# Patient Record
Sex: FEMALE | Race: BLACK OR AFRICAN AMERICAN | Employment: FULL TIME | URBAN - METROPOLITAN AREA
[De-identification: names, ages, dates, MRNs, and addresses within clinical notes are randomized per-mention and may not be internally consistent; named-entity substitution may affect disease eponyms.]

---

## 2020-04-23 LAB — TYPE, ABO & RH, EXTERNAL: NORMAL

## 2020-08-12 ENCOUNTER — ROUTINE PRENATAL (OUTPATIENT)
Dept: OBGYN CLINIC | Age: 30
End: 2020-08-12
Payer: COMMERCIAL

## 2020-08-12 VITALS
HEIGHT: 61 IN | SYSTOLIC BLOOD PRESSURE: 118 MMHG | BODY MASS INDEX: 51.83 KG/M2 | DIASTOLIC BLOOD PRESSURE: 76 MMHG | WEIGHT: 274.5 LBS

## 2020-08-12 DIAGNOSIS — O99.210 PREGNANCY COMPLICATED BY OBESITY, UNSPECIFIED TRIMESTER: Primary | ICD-10-CM

## 2020-08-12 DIAGNOSIS — Z3A.25 PREGNANCY WITH 25 COMPLETED WEEKS GESTATION: ICD-10-CM

## 2020-08-12 DIAGNOSIS — O09.92 HIGH-RISK PREGNANCY IN SECOND TRIMESTER: ICD-10-CM

## 2020-08-12 DIAGNOSIS — A59.9 TRICHIMONIASIS: ICD-10-CM

## 2020-08-12 PROCEDURE — 0502F SUBSEQUENT PRENATAL CARE: CPT | Performed by: OBSTETRICS & GYNECOLOGY

## 2020-08-12 PROCEDURE — 99213 OFFICE O/P EST LOW 20 MIN: CPT | Performed by: OBSTETRICS & GYNECOLOGY

## 2020-08-12 RX ORDER — METRONIDAZOLE 500 MG/1
500 TABLET ORAL 2 TIMES DAILY
Qty: 14 TAB | Refills: 0 | Status: SHIPPED | OUTPATIENT
Start: 2020-08-12 | End: 2020-08-19

## 2020-08-12 NOTE — LETTER
NOTIFICATION RETURN TO WORK / SCHOOL 
 
8/12/2020 9:13 AM 
 
Ms[phone] [id number] To Whom It May Concern: 
 
Mary Kayemma Mary is currently under the care of [address] 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 If there are questions or concerns please have the patient contact our office. Sincerely, Radha Aguilar MD

## 2020-08-12 NOTE — PROGRESS NOTES
Ketones:NEG // SG:NEG //  Vallarie Proper //  Ph:NEG //  Nit:NEG // Leuk: NEG   Pt presents with no new ob complaints//PT WILL COME  FOR WARD Anglin APPT 8/14/20//[facility]

## 2020-08-12 NOTE — PROGRESS NOTES
04/23/2020 9 wks 5 days zspasic1 9 wks Vertex 155 Present Yes 128/74 260.2 lbs trace none neg Negative none Comments: Pt presents for NOB appt with complaints of nausea//OB PAP, OB PANEL TODAY//[facility]    05/20/2020 13 wks 4 days zspasic1 13 wks Vertex 133 Present Yes none 124/72 262 lbs none none neg Negative none Comments: Pt presents with no new ob complaints//AFP NEXT//[facility]    06/17/2020 17 wks 4 days zspasic1 17 wks Vertex 144 Present Yes 128/70 261.6 lbs none none neg Negative none Comments: Pt presents with complaints of constipation and increase in discharge \"somedays\"//AFP TODAY//[facility] ANATOMY SCAN IN 3 WEEKS RTC IN 4 WEEKS    07/14/2020 21 wks 3 days zspasic1 21 wks Vertex 144 Present Yes none 118/64 268.4 lbs none none neg Negative none Comments: Pt presents with no new ob complaints//REFERRING TO M FOR ANATOMY, AFP WNL//[facility]

## 2020-08-13 DIAGNOSIS — Z3A.25 PREGNANCY WITH 25 COMPLETED WEEKS GESTATION: Primary | ICD-10-CM

## 2020-08-14 LAB
GLUCOSE 1H P 50 G GLC PO SERPL-MCNC: 141 MG/DL (ref 65–139)
HGB BLD-MCNC: 10.8 G/DL (ref 11.1–15.9)

## 2020-08-17 ENCOUNTER — TELEPHONE (OUTPATIENT)
Dept: OBGYN CLINIC | Age: 30
End: 2020-08-17

## 2020-08-17 DIAGNOSIS — O99.810 ABNORMAL GLUCOSE TOLERANCE IN PREGNANCY: Primary | ICD-10-CM

## 2020-08-17 NOTE — TELEPHONE ENCOUNTER
Spoke with patient regarding her results. Instructions given for 3 hour test.  Patient states she will come in tomorrow. Ordered entered.

## 2020-08-17 NOTE — TELEPHONE ENCOUNTER
----- Message from Codi Patel MD sent at 8/16/2020  2:25 PM EDT -----  Please call the patient regarding her abnormal result.   Need 3 GTT

## 2020-08-18 DIAGNOSIS — O99.810 ABNORMAL GLUCOSE AFFECTING PREGNANCY: Primary | ICD-10-CM

## 2020-08-19 LAB
GLUCOSE 1H P 100 G GLC PO SERPL-MCNC: 170 MG/DL (ref 65–179)
GLUCOSE 2H P 100 G GLC PO SERPL-MCNC: 164 MG/DL (ref 65–154)
GLUCOSE 3H P 100 G GLC PO SERPL-MCNC: 141 MG/DL (ref 65–139)
GLUCOSE P FAST SERPL-MCNC: 80 MG/DL (ref 65–94)
NOTE:, 102047: ABNORMAL

## 2020-09-02 ENCOUNTER — ROUTINE PRENATAL (OUTPATIENT)
Dept: OBGYN CLINIC | Age: 30
End: 2020-09-02
Payer: COMMERCIAL

## 2020-09-02 VITALS
BODY MASS INDEX: 52.01 KG/M2 | DIASTOLIC BLOOD PRESSURE: 78 MMHG | HEIGHT: 61 IN | WEIGHT: 275.5 LBS | SYSTOLIC BLOOD PRESSURE: 124 MMHG

## 2020-09-02 DIAGNOSIS — R82.90 ABNORMAL FINDING IN URINE: ICD-10-CM

## 2020-09-02 DIAGNOSIS — Z34.92 NORMAL PREGNANCY IN SECOND TRIMESTER: ICD-10-CM

## 2020-09-02 DIAGNOSIS — Z3A.28 28 WEEKS GESTATION OF PREGNANCY: Primary | ICD-10-CM

## 2020-09-02 PROBLEM — Z34.90 PREGNANT: Status: ACTIVE | Noted: 2020-09-02

## 2020-09-02 PROCEDURE — 0502F SUBSEQUENT PRENATAL CARE: CPT | Performed by: OBSTETRICS & GYNECOLOGY

## 2020-09-02 NOTE — PROGRESS NOTES
Ketones:NEG // Caitlin Batista //  Chio Martines //  Ph:NEG //  aMricel Forbes // Jazmyne Vicky: LARGE  Pt presents with no new ob complaints//FAILED 3HR, URINE CULTURE SENTTODAY//[facility]

## 2020-09-02 NOTE — LETTER
NOTIFICATION OF RETURN TO WORK / SCHOOL 
 
9/2/2020 8:45 AM 
 
Ms. [address] [id number] [facility] To Whom It May Concern: 
 
Jennifer Salas was under the care of [address]. She is currently able to work but should not be placed on the [facility] unit. If there are questions or concerns please have the patient contact our office. Sincerely, Sharon Latham MD

## 2020-09-03 ENCOUNTER — DOCUMENTATION ONLY (OUTPATIENT)
Dept: OBGYN CLINIC | Age: 30
End: 2020-09-03

## 2020-09-03 ENCOUNTER — TELEPHONE (OUTPATIENT)
Dept: OBGYN CLINIC | Age: 30
End: 2020-09-03

## 2020-09-03 NOTE — TELEPHONE ENCOUNTER
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 Walked in today requesting she may have a letter that she can work without any restrictions. Was told by her manager that she would not be able to work at all.

## 2020-09-04 LAB — BACTERIA UR CULT: NORMAL

## 2020-09-18 ENCOUNTER — ROUTINE PRENATAL (OUTPATIENT)
Dept: OBGYN CLINIC | Age: 30
End: 2020-09-18
Payer: COMMERCIAL

## 2020-09-18 VITALS
DIASTOLIC BLOOD PRESSURE: 78 MMHG | BODY MASS INDEX: 51.95 KG/M2 | SYSTOLIC BLOOD PRESSURE: 124 MMHG | WEIGHT: 275.13 LBS | HEIGHT: 61 IN

## 2020-09-18 DIAGNOSIS — O09.92 HIGH-RISK PREGNANCY IN SECOND TRIMESTER: ICD-10-CM

## 2020-09-18 DIAGNOSIS — Z3A.30 PREGNANCY WITH 30 COMPLETED WEEKS GESTATION: ICD-10-CM

## 2020-09-18 DIAGNOSIS — O99.210 PREGNANCY COMPLICATED BY OBESITY, UNSPECIFIED TRIMESTER: Primary | ICD-10-CM

## 2020-09-18 DIAGNOSIS — O24.410 DIET CONTROLLED GESTATIONAL DIABETES MELLITUS (GDM) IN SECOND TRIMESTER: ICD-10-CM

## 2020-09-18 PROCEDURE — 0502F SUBSEQUENT PRENATAL CARE: CPT | Performed by: OBSTETRICS & GYNECOLOGY

## 2020-09-18 NOTE — PROGRESS NOTES
Ketones:NEG // SG:NEG //  Geoff Hodgson //  Ph:NEG //  Nit:NEG // Leuk: NEG   Pt presents with no new ob complaints//SEEING MFM FOR GESTATIONAL DIABETES//[place]

## 2020-10-02 ENCOUNTER — ROUTINE PRENATAL (OUTPATIENT)
Dept: OBGYN CLINIC | Age: 30
End: 2020-10-02
Payer: COMMERCIAL

## 2020-10-02 VITALS
WEIGHT: 276 LBS | SYSTOLIC BLOOD PRESSURE: 130 MMHG | BODY MASS INDEX: 52.11 KG/M2 | HEIGHT: 61 IN | DIASTOLIC BLOOD PRESSURE: 72 MMHG

## 2020-10-02 DIAGNOSIS — O24.414 INSULIN CONTROLLED GESTATIONAL DIABETES MELLITUS (GDM) IN THIRD TRIMESTER: ICD-10-CM

## 2020-10-02 DIAGNOSIS — R82.90 UNSPECIFIED ABNORMAL FINDINGS IN URINE: Primary | ICD-10-CM

## 2020-10-02 DIAGNOSIS — O99.210 PREGNANCY COMPLICATED BY OBESITY, UNSPECIFIED TRIMESTER: ICD-10-CM

## 2020-10-02 DIAGNOSIS — O09.93 HIGH-RISK PREGNANCY IN THIRD TRIMESTER: ICD-10-CM

## 2020-10-02 LAB
BILIRUB UR QL STRIP: NEGATIVE
GLUCOSE UR-MCNC: NEGATIVE MG/DL
KETONES P FAST UR STRIP-MCNC: NORMAL MG/DL
PH UR STRIP: 6 [PH] (ref 4.6–8)
PROT UR QL STRIP: NEGATIVE
SP GR UR STRIP: 1.02 (ref 1–1.03)
UA UROBILINOGEN AMB POC: NORMAL (ref 0.2–1)
URINALYSIS CLARITY POC: NORMAL
URINALYSIS COLOR POC: NORMAL
URINE BLOOD POC: NORMAL
URINE LEUKOCYTES POC: NORMAL
URINE NITRITES POC: NEGATIVE

## 2020-10-02 PROCEDURE — 0502F SUBSEQUENT PRENATAL CARE: CPT | Performed by: OBSTETRICS & GYNECOLOGY

## 2020-10-02 PROCEDURE — 81001 URINALYSIS AUTO W/SCOPE: CPT | Performed by: OBSTETRICS & GYNECOLOGY

## 2020-10-02 NOTE — PROGRESS NOTES
HIGH RISK  IDDM  OBESITY  FOLLOWED BY MFM  WILL START NST WEEKLY  GROWTH SCAN SCHEDULED, WILL BE Q4 WEEKS

## 2020-10-02 NOTE — PROGRESS NOTES
Ketones:NEG // SG:NEG //  Blo:++ //  Ph:NEG //  Nit:NEG // Leuk: ++++   Pt presents with no new ob complaints, BS log scanned in//GBS NEXT//[facility]

## 2020-10-04 LAB — BACTERIA UR CULT: NORMAL

## 2020-10-12 ENCOUNTER — ROUTINE PRENATAL (OUTPATIENT)
Dept: OBGYN CLINIC | Age: 30
End: 2020-10-12
Payer: COMMERCIAL

## 2020-10-12 VITALS
BODY MASS INDEX: 52.13 KG/M2 | WEIGHT: 276.13 LBS | DIASTOLIC BLOOD PRESSURE: 70 MMHG | SYSTOLIC BLOOD PRESSURE: 126 MMHG | HEIGHT: 61 IN

## 2020-10-12 DIAGNOSIS — O24.414 INSULIN CONTROLLED GESTATIONAL DIABETES MELLITUS (GDM) IN THIRD TRIMESTER: ICD-10-CM

## 2020-10-12 DIAGNOSIS — O09.93 HIGH-RISK PREGNANCY IN THIRD TRIMESTER: ICD-10-CM

## 2020-10-12 DIAGNOSIS — Z3A.34 34 WEEKS GESTATION OF PREGNANCY: Primary | ICD-10-CM

## 2020-10-12 DIAGNOSIS — O99.210 PREGNANCY COMPLICATED BY OBESITY, UNSPECIFIED TRIMESTER: ICD-10-CM

## 2020-10-12 PROCEDURE — 0502F SUBSEQUENT PRENATAL CARE: CPT | Performed by: OBSTETRICS & GYNECOLOGY

## 2020-10-12 PROCEDURE — 59025 FETAL NON-STRESS TEST: CPT | Performed by: OBSTETRICS & GYNECOLOGY

## 2020-10-12 NOTE — PROGRESS NOTES
Ketones:NEG // SG:NEG //  Awanda Basilio //  Ph:NEG //  Nit:NEG // Leuk: NEG   Pt presents with complaints of lower back pain//BS LOG SCANNED IN, GBS NEXT//[facility]

## 2020-10-19 ENCOUNTER — ROUTINE PRENATAL (OUTPATIENT)
Dept: OBGYN CLINIC | Age: 30
End: 2020-10-19
Payer: COMMERCIAL

## 2020-10-19 VITALS
WEIGHT: 277.25 LBS | SYSTOLIC BLOOD PRESSURE: 130 MMHG | HEIGHT: 61 IN | BODY MASS INDEX: 52.35 KG/M2 | DIASTOLIC BLOOD PRESSURE: 76 MMHG

## 2020-10-19 DIAGNOSIS — R82.90 ABNORMAL FINDING IN URINE: ICD-10-CM

## 2020-10-19 DIAGNOSIS — Z3A.35 35 WEEKS GESTATION OF PREGNANCY: Primary | ICD-10-CM

## 2020-10-19 DIAGNOSIS — O24.414 INSULIN CONTROLLED GESTATIONAL DIABETES MELLITUS (GDM) DURING PREGNANCY, ANTEPARTUM: ICD-10-CM

## 2020-10-19 DIAGNOSIS — O09.93 HIGH-RISK PREGNANCY IN THIRD TRIMESTER: ICD-10-CM

## 2020-10-19 PROCEDURE — 59025 FETAL NON-STRESS TEST: CPT | Performed by: OBSTETRICS & GYNECOLOGY

## 2020-10-19 PROCEDURE — 0502F SUBSEQUENT PRENATAL CARE: CPT | Performed by: OBSTETRICS & GYNECOLOGY

## 2020-10-19 NOTE — PROGRESS NOTES
Ketones:NEG // SG:NEG //  Jarad Jose //  Ph:NEG //  Nit:NEG // Leuk: ++++  Pt presents with complaints of lower back pain//URINE CULTURE TODAY, GBS NEXT//[facility]

## 2020-10-21 LAB — BACTERIA UR CULT: NORMAL

## 2020-10-22 ENCOUNTER — TRANSCRIBE ORDER (OUTPATIENT)
Dept: SCHEDULING | Age: 30
End: 2020-10-22

## 2020-10-22 DIAGNOSIS — O26.843 UTERINE SIZE-DATE DISCREPANCY IN THIRD TRIMESTER: Primary | ICD-10-CM

## 2020-10-22 DIAGNOSIS — O09.93 SUPERVISION OF HIGH RISK PREGNANCY IN THIRD TRIMESTER: Primary | ICD-10-CM

## 2020-10-26 DIAGNOSIS — Z13.83 SCREENING FOR RESPIRATORY CONDITION: ICD-10-CM

## 2020-10-26 DIAGNOSIS — Z13.83 SCREENING FOR RESPIRATORY CONDITION: Primary | ICD-10-CM

## 2020-10-27 ENCOUNTER — ROUTINE PRENATAL (OUTPATIENT)
Dept: OBGYN CLINIC | Age: 30
End: 2020-10-27
Payer: COMMERCIAL

## 2020-10-27 VITALS
BODY MASS INDEX: 52.91 KG/M2 | HEIGHT: 61 IN | DIASTOLIC BLOOD PRESSURE: 78 MMHG | WEIGHT: 280.25 LBS | SYSTOLIC BLOOD PRESSURE: 134 MMHG

## 2020-10-27 DIAGNOSIS — O99.210 PREGNANCY COMPLICATED BY OBESITY, UNSPECIFIED TRIMESTER: ICD-10-CM

## 2020-10-27 DIAGNOSIS — Z36.85 SCREENING, ANTENATAL, FOR STREPTOCOCCUS B: Primary | ICD-10-CM

## 2020-10-27 DIAGNOSIS — O09.93 HIGH-RISK PREGNANCY IN THIRD TRIMESTER: ICD-10-CM

## 2020-10-27 DIAGNOSIS — R82.998 OTHER ABNORMAL FINDINGS IN URINE: ICD-10-CM

## 2020-10-27 DIAGNOSIS — O24.410 DIET CONTROLLED GESTATIONAL DIABETES MELLITUS (GDM) IN THIRD TRIMESTER: ICD-10-CM

## 2020-10-27 DIAGNOSIS — Z3A.36 36 WEEKS GESTATION OF PREGNANCY: ICD-10-CM

## 2020-10-27 PROCEDURE — 0502F SUBSEQUENT PRENATAL CARE: CPT | Performed by: OBSTETRICS & GYNECOLOGY

## 2020-10-27 PROCEDURE — 59025 FETAL NON-STRESS TEST: CPT | Performed by: OBSTETRICS & GYNECOLOGY

## 2020-10-27 NOTE — PROGRESS NOTES
Ketones:NEG // SG:NEG //  Jaclyn Mcbride //  Ph:NEG //  Nit:POS // Leuk: ++++  Pt presents with complaints of lower back pain and losing her mucus plug//GBS TODAY//[facility]

## 2020-10-28 ENCOUNTER — HOSPITAL ENCOUNTER (OUTPATIENT)
Dept: MAMMOGRAPHY | Age: 30
Discharge: HOME | End: 2020-10-28
Attending: OBSTETRICS & GYNECOLOGY
Payer: COMMERCIAL

## 2020-10-28 DIAGNOSIS — O26.843 UTERINE SIZE-DATE DISCREPANCY IN THIRD TRIMESTER: ICD-10-CM

## 2020-10-28 PROCEDURE — 76805 OB US >/= 14 WKS SNGL FETUS: CPT

## 2020-10-29 LAB — BACTERIA UR CULT: NORMAL

## 2020-10-30 DIAGNOSIS — N30.00 ACUTE CYSTITIS WITHOUT HEMATURIA: Primary | ICD-10-CM

## 2020-10-30 RX ORDER — NITROFURANTOIN 25; 75 MG/1; MG/1
100 CAPSULE ORAL 2 TIMES DAILY
Qty: 14 CAP | Refills: 0 | Status: SHIPPED | OUTPATIENT
Start: 2020-10-30 | End: 2020-11-06

## 2020-10-31 LAB — B-HEM STREP SPEC QL CULT: NEGATIVE

## 2020-11-03 ENCOUNTER — ROUTINE PRENATAL (OUTPATIENT)
Dept: OBGYN CLINIC | Age: 30
End: 2020-11-03
Payer: COMMERCIAL

## 2020-11-03 VITALS
SYSTOLIC BLOOD PRESSURE: 130 MMHG | HEIGHT: 61 IN | DIASTOLIC BLOOD PRESSURE: 78 MMHG | BODY MASS INDEX: 52.91 KG/M2 | WEIGHT: 280.25 LBS

## 2020-11-03 DIAGNOSIS — O99.210 PREGNANCY COMPLICATED BY OBESITY, UNSPECIFIED TRIMESTER: ICD-10-CM

## 2020-11-03 DIAGNOSIS — N76.0 ACUTE VAGINITIS: ICD-10-CM

## 2020-11-03 DIAGNOSIS — R30.0 DYSURIA: ICD-10-CM

## 2020-11-03 DIAGNOSIS — O09.93 HIGH-RISK PREGNANCY IN THIRD TRIMESTER: ICD-10-CM

## 2020-11-03 DIAGNOSIS — O24.419 GESTATIONAL DIABETES MELLITUS (GDM), ANTEPARTUM, GESTATIONAL DIABETES METHOD OF CONTROL UNSPECIFIED: ICD-10-CM

## 2020-11-03 DIAGNOSIS — B37.9 CANDIDA ALBICANS INFECTION: ICD-10-CM

## 2020-11-03 DIAGNOSIS — Z3A.37 37 WEEKS GESTATION OF PREGNANCY: Primary | ICD-10-CM

## 2020-11-03 PROCEDURE — 59025 FETAL NON-STRESS TEST: CPT | Performed by: OBSTETRICS & GYNECOLOGY

## 2020-11-03 PROCEDURE — 0502F SUBSEQUENT PRENATAL CARE: CPT | Performed by: OBSTETRICS & GYNECOLOGY

## 2020-11-03 RX ORDER — TERCONAZOLE 4 MG/G
1 CREAM VAGINAL
Qty: 1 TUBE | Refills: 1 | Status: SHIPPED | OUTPATIENT
Start: 2020-11-03 | End: 2020-11-10

## 2020-11-03 NOTE — PROGRESS NOTES
Ketones:NEG // SG:NEG //  Blo:+ //  Ph:NEG //  Nita Showmonroe // Leuk: ++++  Pt presents with complaints of right side pain and pain with urination//UC, NUSWAB TODAY//[facility]

## 2020-11-03 NOTE — PROGRESS NOTES
HIGH RISK OB  DM DIET CONTROLLED  EXCELLENT BS CONTROL  FOLLOWED BY MFM  NST REACTGIVE  YEAST + TERAZOL 7 CALLED

## 2020-11-05 LAB — BACTERIA UR CULT: NORMAL

## 2020-11-06 LAB
A VAGINAE DNA VAG QL NAA+PROBE: ABNORMAL SCORE
BVAB2 DNA VAG QL NAA+PROBE: ABNORMAL SCORE
C ALBICANS DNA VAG QL NAA+PROBE: POSITIVE
C GLABRATA DNA VAG QL NAA+PROBE: NEGATIVE
C KRUSEI DNA VAG QL NAA+PROBE: NEGATIVE
C LUSITANIAE DNA VAG QL NAA+PROBE: NEGATIVE
C TRACH DNA VAG QL NAA+PROBE: NEGATIVE
CANDIDA DNA VAG QL NAA+PROBE: NEGATIVE
MEGA1 DNA VAG QL NAA+PROBE: ABNORMAL SCORE
N GONORRHOEA DNA VAG QL NAA+PROBE: NEGATIVE
T VAGINALIS DNA VAG QL NAA+PROBE: NEGATIVE

## 2020-11-09 ENCOUNTER — HOSPITAL ENCOUNTER (OUTPATIENT)
Dept: PREADMISSION TESTING | Age: 30
Discharge: HOME | End: 2020-11-09
Payer: COMMERCIAL

## 2020-11-09 LAB — SARS-COV-2, COV2: NORMAL

## 2020-11-09 PROCEDURE — 87635 SARS-COV-2 COVID-19 AMP PRB: CPT

## 2020-11-10 LAB — SARS-COV-2, COV2NT: NOT DETECTED

## 2020-11-11 ENCOUNTER — ROUTINE PRENATAL (OUTPATIENT)
Dept: OBGYN CLINIC | Age: 30
End: 2020-11-11
Payer: COMMERCIAL

## 2020-11-11 VITALS
WEIGHT: 283.25 LBS | DIASTOLIC BLOOD PRESSURE: 82 MMHG | BODY MASS INDEX: 53.48 KG/M2 | SYSTOLIC BLOOD PRESSURE: 134 MMHG | HEIGHT: 61 IN

## 2020-11-11 DIAGNOSIS — O99.210 PREGNANCY COMPLICATED BY OBESITY, UNSPECIFIED TRIMESTER: ICD-10-CM

## 2020-11-11 DIAGNOSIS — E11.9 TYPE 2 DIABETES MELLITUS WITHOUT COMPLICATION, UNSPECIFIED WHETHER LONG TERM INSULIN USE (HCC): ICD-10-CM

## 2020-11-11 DIAGNOSIS — O09.93 HIGH-RISK PREGNANCY IN THIRD TRIMESTER: Primary | ICD-10-CM

## 2020-11-11 DIAGNOSIS — Z3A.38 38 WEEKS GESTATION OF PREGNANCY: ICD-10-CM

## 2020-11-11 PROCEDURE — 0502F SUBSEQUENT PRENATAL CARE: CPT | Performed by: OBSTETRICS & GYNECOLOGY

## 2020-11-11 PROCEDURE — 59025 FETAL NON-STRESS TEST: CPT | Performed by: OBSTETRICS & GYNECOLOGY

## 2020-11-11 NOTE — PROGRESS NOTES
Ketones:NEG // SG:NEG //  Rahel Fent //  Ph:NEG //  Nit:NEG // Leuk: NEG   Pt presents with complaints of swelling in feet and hands//GBS NEG//[facility]

## 2020-11-16 ENCOUNTER — ROUTINE PRENATAL (OUTPATIENT)
Dept: OBGYN CLINIC | Age: 30
End: 2020-11-16
Payer: COMMERCIAL

## 2020-11-16 VITALS
HEIGHT: 61 IN | BODY MASS INDEX: 53.81 KG/M2 | WEIGHT: 285 LBS | DIASTOLIC BLOOD PRESSURE: 80 MMHG | SYSTOLIC BLOOD PRESSURE: 130 MMHG

## 2020-11-16 DIAGNOSIS — Z3A.39 39 WEEKS GESTATION OF PREGNANCY: Primary | ICD-10-CM

## 2020-11-16 DIAGNOSIS — O09.93 HIGH-RISK PREGNANCY IN THIRD TRIMESTER: ICD-10-CM

## 2020-11-16 DIAGNOSIS — O24.419 GESTATIONAL DIABETES MELLITUS (GDM) IN THIRD TRIMESTER, GESTATIONAL DIABETES METHOD OF CONTROL UNSPECIFIED: ICD-10-CM

## 2020-11-16 PROCEDURE — 0502F SUBSEQUENT PRENATAL CARE: CPT | Performed by: OBSTETRICS & GYNECOLOGY

## 2020-11-16 PROCEDURE — 59025 FETAL NON-STRESS TEST: CPT | Performed by: OBSTETRICS & GYNECOLOGY

## 2020-11-16 NOTE — PROGRESS NOTES
Ketones:NEG // SG:NEG //  Dameon Pancho //  Ph:NEG //  Nit:NEG // Leuk: NEG   Pt presents with no new ob complaints//GBS NEG//[facility]

## 2020-11-17 ENCOUNTER — HOSPITAL ENCOUNTER (INPATIENT)
Age: 30
LOS: 4 days | Discharge: HOME | End: 2020-11-21
Attending: OBSTETRICS & GYNECOLOGY | Admitting: OBSTETRICS & GYNECOLOGY
Payer: COMMERCIAL

## 2020-11-17 PROBLEM — O24.410 DIET CONTROLLED GESTATIONAL DIABETES MELLITUS (GDM) IN THIRD TRIMESTER: Status: ACTIVE | Noted: 2020-11-17

## 2020-11-17 PROBLEM — Z34.90 PREGNANCY: Status: ACTIVE | Noted: 2020-11-17

## 2020-11-17 PROBLEM — Z3A.39 39 WEEKS GESTATION OF PREGNANCY: Status: ACTIVE | Noted: 2020-11-17

## 2020-11-17 LAB
ABO + RH BLD: NORMAL
APPEARANCE UR: ABNORMAL
BACTERIA URNS QL MICRO: NEGATIVE /HPF
BASOPHILS # BLD: 0 K/UL (ref 0–0.1)
BASOPHILS NFR BLD: 0 % (ref 0–1)
BILIRUB UR QL: NEGATIVE
BLOOD GROUP ANTIBODIES SERPL: NEGATIVE
COLOR UR: ABNORMAL
DIFFERENTIAL METHOD BLD: ABNORMAL
EOSINOPHIL # BLD: 0.1 K/UL (ref 0–0.4)
EOSINOPHIL NFR BLD: 1 % (ref 0–7)
ERYTHROCYTE [DISTWIDTH] IN BLOOD BY AUTOMATED COUNT: 16.8 % (ref 11.5–14.5)
GLUCOSE UR STRIP.AUTO-MCNC: NEGATIVE MG/DL
HCT VFR BLD AUTO: 41 % (ref 35–47)
HGB BLD-MCNC: 13.4 G/DL (ref 11.5–16)
HGB UR QL STRIP: NEGATIVE
IMM GRANULOCYTES # BLD AUTO: 0.2 K/UL (ref 0–0.04)
IMM GRANULOCYTES NFR BLD AUTO: 1 % (ref 0–0.5)
KETONES UR QL STRIP.AUTO: NEGATIVE MG/DL
LEUKOCYTE ESTERASE UR QL STRIP.AUTO: ABNORMAL
LYMPHOCYTES # BLD: 2.2 K/UL (ref 0.8–3.5)
LYMPHOCYTES NFR BLD: 17 % (ref 12–49)
MCH RBC QN AUTO: 29.2 PG (ref 26–34)
MCHC RBC AUTO-ENTMCNC: 32.7 G/DL (ref 30–36.5)
MCV RBC AUTO: 89.3 FL (ref 80–99)
MONOCYTES # BLD: 0.9 K/UL (ref 0–1)
MONOCYTES NFR BLD: 7 % (ref 5–13)
MUCOUS THREADS URNS QL MICRO: ABNORMAL /LPF
NEUTS SEG # BLD: 9.5 K/UL (ref 1.8–8)
NEUTS SEG NFR BLD: 74 % (ref 32–75)
NITRITE UR QL STRIP.AUTO: NEGATIVE
PH UR STRIP: 6 [PH] (ref 5–8)
PLATELET # BLD AUTO: 283 K/UL (ref 150–400)
PMV BLD AUTO: 10.6 FL (ref 8.9–12.9)
PROT UR STRIP-MCNC: NEGATIVE MG/DL
RBC # BLD AUTO: 4.59 M/UL (ref 3.8–5.2)
RBC #/AREA URNS HPF: ABNORMAL /HPF (ref 0–5)
SP GR UR REFRACTOMETRY: 1.01 (ref 1–1.03)
SPECIMEN EXP DATE BLD: NORMAL
UA: UC IF INDICATED,UAUC: ABNORMAL
UROBILINOGEN UR QL STRIP.AUTO: 0.1 EU/DL (ref 0.1–1)
WBC # BLD AUTO: 12.6 K/UL (ref 3.6–11)
WBC URNS QL MICRO: ABNORMAL /HPF (ref 0–4)

## 2020-11-17 PROCEDURE — 85025 COMPLETE CBC W/AUTO DIFF WBC: CPT

## 2020-11-17 PROCEDURE — 86900 BLOOD TYPING SEROLOGIC ABO: CPT

## 2020-11-17 PROCEDURE — 65410000002 HC RM PRIVATE OB

## 2020-11-17 PROCEDURE — 74011250636 HC RX REV CODE- 250/636: Performed by: OBSTETRICS & GYNECOLOGY

## 2020-11-17 PROCEDURE — 74011250637 HC RX REV CODE- 250/637: Performed by: OBSTETRICS & GYNECOLOGY

## 2020-11-17 PROCEDURE — 36415 COLL VENOUS BLD VENIPUNCTURE: CPT

## 2020-11-17 PROCEDURE — 59200 INSERT CERVICAL DILATOR: CPT

## 2020-11-17 PROCEDURE — 81001 URINALYSIS AUTO W/SCOPE: CPT

## 2020-11-17 RX ORDER — BUTORPHANOL TARTRATE 1 MG/ML
1 INJECTION INTRAMUSCULAR; INTRAVENOUS
Status: DISCONTINUED | OUTPATIENT
Start: 2020-11-17 | End: 2020-11-19

## 2020-11-17 RX ORDER — BUTORPHANOL TARTRATE 1 MG/ML
1 INJECTION INTRAMUSCULAR; INTRAVENOUS
Status: DISCONTINUED | OUTPATIENT
Start: 2020-11-17 | End: 2020-11-17

## 2020-11-17 RX ORDER — ZOLPIDEM TARTRATE 5 MG/1
5 TABLET ORAL
Status: DISCONTINUED | OUTPATIENT
Start: 2020-11-17 | End: 2020-11-19

## 2020-11-17 RX ORDER — ONDANSETRON 2 MG/ML
4 INJECTION INTRAMUSCULAR; INTRAVENOUS
Status: DISCONTINUED | OUTPATIENT
Start: 2020-11-17 | End: 2020-11-18

## 2020-11-17 RX ADMIN — BUTORPHANOL TARTRATE 1 MG: 1 INJECTION, SOLUTION INTRAMUSCULAR; INTRAVENOUS at 23:12

## 2020-11-17 RX ADMIN — ZOLPIDEM TARTRATE 5 MG: 5 TABLET ORAL at 20:57

## 2020-11-17 RX ADMIN — DINOPROSTONE 10 MG: 10 INSERT VAGINAL at 17:29

## 2020-11-17 NOTE — PROGRESS NOTES
1630 Patient ambulated to L&D room 3 for scheduled induction with significant other Erwin. Patient to bathroom to change into gown and provide urine specimen. 1639 External monitoring started with positive fetal heart tones. Positive fetal movement. 18 #20g INT placed in left lateral AC by Colby Dunne, WILLIAMS on first attempt. Patient tolerated well. [facility] Dr. Carlos Lesches to assess patient and place Cervidil vaginally. MD discussed plan of care. Patient verbalized understanding    1805 Diet ginger ale and ice water provided per request.  Significant other at bedside for emotional support. 1815 Significant other out to get dinner. Patient resting watching T.V. No c/o or request at this time. 1902 Broken fhr tracing while attempting to get out of bed to void. 1905 Bedside report given to Julia Valero RN. Patient return to bed after voiding.

## 2020-11-18 ENCOUNTER — ANESTHESIA EVENT (OUTPATIENT)
Dept: LABOR AND DELIVERY | Age: 30
End: 2020-11-18
Payer: COMMERCIAL

## 2020-11-18 ENCOUNTER — ANESTHESIA (OUTPATIENT)
Dept: LABOR AND DELIVERY | Age: 30
End: 2020-11-18
Payer: COMMERCIAL

## 2020-11-18 ENCOUNTER — HOSPITAL ENCOUNTER (OUTPATIENT)
Dept: PREADMISSION TESTING | Age: 30
Discharge: HOME | End: 2020-11-18

## 2020-11-18 VITALS — OXYGEN SATURATION: 99 % | HEART RATE: 72 BPM | DIASTOLIC BLOOD PRESSURE: 60 MMHG | SYSTOLIC BLOOD PRESSURE: 154 MMHG

## 2020-11-18 LAB
GLUCOSE BLD STRIP.AUTO-MCNC: 64 MG/DL (ref 65–100)
GLUCOSE BLD STRIP.AUTO-MCNC: 89 MG/DL (ref 65–100)
GLUCOSE BLD STRIP.AUTO-MCNC: 91 MG/DL (ref 65–100)
PERFORMED BY, TECHID: ABNORMAL
PERFORMED BY, TECHID: NORMAL
PERFORMED BY, TECHID: NORMAL

## 2020-11-18 PROCEDURE — 74011250636 HC RX REV CODE- 250/636: Performed by: OBSTETRICS & GYNECOLOGY

## 2020-11-18 PROCEDURE — 00HU33Z INSERTION OF INFUSION DEVICE INTO SPINAL CANAL, PERCUTANEOUS APPROACH: ICD-10-PCS | Performed by: ANESTHESIOLOGY

## 2020-11-18 PROCEDURE — 3E033VJ INTRODUCTION OF OTHER HORMONE INTO PERIPHERAL VEIN, PERCUTANEOUS APPROACH: ICD-10-PCS | Performed by: OBSTETRICS & GYNECOLOGY

## 2020-11-18 PROCEDURE — 82962 GLUCOSE BLOOD TEST: CPT

## 2020-11-18 PROCEDURE — 74011250636 HC RX REV CODE- 250/636: Performed by: NURSE ANESTHETIST, CERTIFIED REGISTERED

## 2020-11-18 PROCEDURE — 65410000002 HC RM PRIVATE OB

## 2020-11-18 PROCEDURE — 74011000250 HC RX REV CODE- 250: Performed by: ANESTHESIOLOGY

## 2020-11-18 PROCEDURE — 76060000078 HC EPIDURAL ANESTHESIA

## 2020-11-18 PROCEDURE — 3E0P7VZ INTRODUCTION OF HORMONE INTO FEMALE REPRODUCTIVE, VIA NATURAL OR ARTIFICIAL OPENING: ICD-10-PCS | Performed by: OBSTETRICS & GYNECOLOGY

## 2020-11-18 PROCEDURE — 74011000250 HC RX REV CODE- 250: Performed by: NURSE ANESTHETIST, CERTIFIED REGISTERED

## 2020-11-18 PROCEDURE — 74011250636 HC RX REV CODE- 250/636

## 2020-11-18 RX ORDER — NALOXONE HYDROCHLORIDE 0.4 MG/ML
0.4 INJECTION, SOLUTION INTRAMUSCULAR; INTRAVENOUS; SUBCUTANEOUS AS NEEDED
Status: DISCONTINUED | OUTPATIENT
Start: 2020-11-18 | End: 2020-11-19

## 2020-11-18 RX ORDER — SODIUM CHLORIDE, SODIUM LACTATE, POTASSIUM CHLORIDE, CALCIUM CHLORIDE 600; 310; 30; 20 MG/100ML; MG/100ML; MG/100ML; MG/100ML
500 INJECTION, SOLUTION INTRAVENOUS ONCE
Status: COMPLETED | OUTPATIENT
Start: 2020-11-18 | End: 2020-11-19

## 2020-11-18 RX ORDER — OXYTOCIN/0.9 % SODIUM CHLORIDE 30/500 ML
1 PLASTIC BAG, INJECTION (ML) INTRAVENOUS
Status: DISCONTINUED | OUTPATIENT
Start: 2020-11-18 | End: 2020-11-19

## 2020-11-18 RX ORDER — BUPIVACAINE HYDROCHLORIDE 5 MG/ML
INJECTION, SOLUTION EPIDURAL; INTRACAUDAL AS NEEDED
Status: DISCONTINUED | OUTPATIENT
Start: 2020-11-18 | End: 2020-11-19 | Stop reason: HOSPADM

## 2020-11-18 RX ORDER — FENTANYL CITRATE 50 UG/ML
INJECTION, SOLUTION INTRAMUSCULAR; INTRAVENOUS
Status: COMPLETED
Start: 2020-11-18 | End: 2020-11-18

## 2020-11-18 RX ORDER — BUPIVACAINE HYDROCHLORIDE 2.5 MG/ML
INJECTION, SOLUTION EPIDURAL; INFILTRATION; INTRACAUDAL
Status: COMPLETED
Start: 2020-11-18 | End: 2020-11-18

## 2020-11-18 RX ORDER — OXYTOCIN/0.9 % SODIUM CHLORIDE 30/500 ML
PLASTIC BAG, INJECTION (ML) INTRAVENOUS
Status: COMPLETED
Start: 2020-11-18 | End: 2020-11-18

## 2020-11-18 RX ORDER — ONDANSETRON 2 MG/ML
4 INJECTION INTRAMUSCULAR; INTRAVENOUS AS NEEDED
Status: DISCONTINUED | OUTPATIENT
Start: 2020-11-18 | End: 2020-11-19

## 2020-11-18 RX ORDER — FENTANYL CITRATE 50 UG/ML
INJECTION, SOLUTION INTRAMUSCULAR; INTRAVENOUS AS NEEDED
Status: DISCONTINUED | OUTPATIENT
Start: 2020-11-18 | End: 2020-11-19 | Stop reason: HOSPADM

## 2020-11-18 RX ORDER — FENTANYL CITRATE 50 UG/ML
INJECTION, SOLUTION INTRAMUSCULAR; INTRAVENOUS
Status: DISCONTINUED
Start: 2020-11-18 | End: 2020-11-19

## 2020-11-18 RX ORDER — BUPIVACAINE HYDROCHLORIDE 2.5 MG/ML
INJECTION, SOLUTION EPIDURAL; INFILTRATION; INTRACAUDAL AS NEEDED
Status: DISCONTINUED | OUTPATIENT
Start: 2020-11-18 | End: 2020-11-19 | Stop reason: HOSPADM

## 2020-11-18 RX ORDER — BUPIVACAINE HYDROCHLORIDE 5 MG/ML
INJECTION, SOLUTION EPIDURAL; INTRACAUDAL
Status: COMPLETED
Start: 2020-11-18 | End: 2020-11-18

## 2020-11-18 RX ORDER — NORETHINDRONE AND ETHINYL ESTRADIOL 0.5-0.035
12.5 KIT ORAL
Status: DISCONTINUED | OUTPATIENT
Start: 2020-11-18 | End: 2020-11-19

## 2020-11-18 RX ORDER — FENTANYL 0.2 MG/100ML-BUPIV 0.125%-NACL 0.9% EPIDURAL INJ 2/0.125 MCG/ML-%
250 SOLUTION INJECTION CONTINUOUS
Status: DISCONTINUED | OUTPATIENT
Start: 2020-11-18 | End: 2020-11-19

## 2020-11-18 RX ORDER — LIDOCAINE HYDROCHLORIDE AND EPINEPHRINE 15; 5 MG/ML; UG/ML
INJECTION, SOLUTION EPIDURAL
Status: SHIPPED | OUTPATIENT
Start: 2020-11-18 | End: 2020-11-18

## 2020-11-18 RX ORDER — SODIUM CHLORIDE, SODIUM LACTATE, POTASSIUM CHLORIDE, CALCIUM CHLORIDE 600; 310; 30; 20 MG/100ML; MG/100ML; MG/100ML; MG/100ML
125 INJECTION, SOLUTION INTRAVENOUS CONTINUOUS
Status: DISCONTINUED | OUTPATIENT
Start: 2020-11-18 | End: 2020-11-19

## 2020-11-18 RX ADMIN — OXYTOCIN-SODIUM CHLORIDE 0.9% IV SOLN 30 UNIT/500ML 12 MILLI-UNITS/MIN: 30-0.9/5 SOLUTION at 14:02

## 2020-11-18 RX ADMIN — SODIUM CHLORIDE, POTASSIUM CHLORIDE, SODIUM LACTATE AND CALCIUM CHLORIDE 125 ML/HR: 600; 310; 30; 20 INJECTION, SOLUTION INTRAVENOUS at 04:06

## 2020-11-18 RX ADMIN — OXYTOCIN-SODIUM CHLORIDE 0.9% IV SOLN 30 UNIT/500ML 4 MILLI-UNITS/MIN: 30-0.9/5 SOLUTION at 09:14

## 2020-11-18 RX ADMIN — OXYTOCIN-SODIUM CHLORIDE 0.9% IV SOLN 30 UNIT/500ML 11 MILLI-UNITS/MIN: 30-0.9/5 SOLUTION at 13:43

## 2020-11-18 RX ADMIN — SODIUM CHLORIDE, POTASSIUM CHLORIDE, SODIUM LACTATE AND CALCIUM CHLORIDE 125 ML/HR: 600; 310; 30; 20 INJECTION, SOLUTION INTRAVENOUS at 20:31

## 2020-11-18 RX ADMIN — OXYTOCIN-SODIUM CHLORIDE 0.9% IV SOLN 30 UNIT/500ML 7 MILLI-UNITS/MIN: 30-0.9/5 SOLUTION at 10:30

## 2020-11-18 RX ADMIN — OXYTOCIN-SODIUM CHLORIDE 0.9% IV SOLN 30 UNIT/500ML 10 MILLI-UNITS/MIN: 30-0.9/5 SOLUTION at 12:50

## 2020-11-18 RX ADMIN — OXYTOCIN-SODIUM CHLORIDE 0.9% IV SOLN 30 UNIT/500ML 6 MILLI-UNITS/MIN: 30-0.9/5 SOLUTION at 10:05

## 2020-11-18 RX ADMIN — LIDOCAINE HYDROCHLORIDE AND EPINEPHRINE 3 ML: 15; 5 INJECTION, SOLUTION EPIDURAL at 11:59

## 2020-11-18 RX ADMIN — OXYTOCIN-SODIUM CHLORIDE 0.9% IV SOLN 30 UNIT/500ML 8 MILLI-UNITS/MIN: 30-0.9/5 SOLUTION at 10:50

## 2020-11-18 RX ADMIN — FENTANYL CITRATE 100 MCG: 50 INJECTION, SOLUTION INTRAMUSCULAR; INTRAVENOUS at 12:04

## 2020-11-18 RX ADMIN — SODIUM CHLORIDE, POTASSIUM CHLORIDE, SODIUM LACTATE AND CALCIUM CHLORIDE 500 ML: 600; 310; 30; 20 INJECTION, SOLUTION INTRAVENOUS at 11:31

## 2020-11-18 RX ADMIN — Medication 250 ML: at 12:50

## 2020-11-18 RX ADMIN — BUPIVACAINE HYDROCHLORIDE 10 ML: 5 INJECTION, SOLUTION EPIDURAL; INTRACAUDAL; PERINEURAL at 20:26

## 2020-11-18 RX ADMIN — OXYTOCIN-SODIUM CHLORIDE 0.9% IV SOLN 30 UNIT/500ML 13 MILLI-UNITS/MIN: 30-0.9/5 SOLUTION at 14:20

## 2020-11-18 RX ADMIN — OXYTOCIN-SODIUM CHLORIDE 0.9% IV SOLN 30 UNIT/500ML 5 MILLI-UNITS/MIN: 30-0.9/5 SOLUTION at 09:35

## 2020-11-18 RX ADMIN — OXYTOCIN-SODIUM CHLORIDE 0.9% IV SOLN 30 UNIT/500ML 2 MILLI-UNITS/MIN: 30-0.9/5 SOLUTION at 08:30

## 2020-11-18 RX ADMIN — OXYTOCIN-SODIUM CHLORIDE 0.9% IV SOLN 30 UNIT/500ML 9 MILLI-UNITS/MIN: 30-0.9/5 SOLUTION at 11:28

## 2020-11-18 RX ADMIN — BUTORPHANOL TARTRATE 1 MG: 1 INJECTION, SOLUTION INTRAMUSCULAR; INTRAVENOUS at 11:31

## 2020-11-18 RX ADMIN — BUTORPHANOL TARTRATE 1 MG: 1 INJECTION, SOLUTION INTRAMUSCULAR; INTRAVENOUS at 08:30

## 2020-11-18 RX ADMIN — OXYTOCIN-SODIUM CHLORIDE 0.9% IV SOLN 30 UNIT/500ML 1 MILLI-UNITS: 30-0.9/5 SOLUTION at 08:15

## 2020-11-18 RX ADMIN — BUPIVACAINE HYDROCHLORIDE 6 ML: 2.5 INJECTION, SOLUTION EPIDURAL; INFILTRATION; INTRACAUDAL at 12:04

## 2020-11-18 RX ADMIN — OXYTOCIN-SODIUM CHLORIDE 0.9% IV SOLN 30 UNIT/500ML 9 MILLI-UNITS/MIN: 30-0.9/5 SOLUTION at 12:28

## 2020-11-18 RX ADMIN — FENTANYL CITRATE 100 MCG: 50 INJECTION, SOLUTION INTRAMUSCULAR; INTRAVENOUS at 20:26

## 2020-11-18 RX ADMIN — OXYTOCIN-SODIUM CHLORIDE 0.9% IV SOLN 30 UNIT/500ML 3 MILLI-UNITS/MIN: 30-0.9/5 SOLUTION at 08:50

## 2020-11-18 RX ADMIN — BUTORPHANOL TARTRATE 1 MG: 1 INJECTION, SOLUTION INTRAMUSCULAR; INTRAVENOUS at 04:04

## 2020-11-18 RX ADMIN — SODIUM CHLORIDE, POTASSIUM CHLORIDE, SODIUM LACTATE AND CALCIUM CHLORIDE 125 ML/HR: 600; 310; 30; 20 INJECTION, SOLUTION INTRAVENOUS at 12:25

## 2020-11-18 NOTE — ANESTHESIA PREPROCEDURE EVALUATION
Relevant Problems   No relevant active problems       Anesthetic History   No history of anesthetic complications            Review of Systems / Medical History  Patient summary reviewed, nursing notes reviewed and pertinent labs reviewed    Pulmonary  Within defined limits                 Neuro/Psych   Within defined limits           Cardiovascular  Within defined limits                     GI/Hepatic/Renal  Within defined limits              Endo/Other             Other Findings   Comments: Gestational diabetes           Physical Exam    Airway  Mallampati: III           Cardiovascular  Regular rate and rhythm,  S1 and S2 normal,  no murmur, click, rub, or gallop             Dental  No notable dental hx       Pulmonary                 Abdominal         Other Findings            Anesthetic Plan    ASA: 2  Anesthesia type: epidural            Anesthetic plan and risks discussed with: Patient

## 2020-11-18 NOTE — ANESTHESIA PROCEDURE NOTES
Epidural Block    Start time: 11/18/2020 11:50 AM  End time: 11/18/2020 12:05 PM  Performed by: Aubery Peabody, CRNA  Authorized by: Aubery Peabody, CRNA     Pre-Procedure  Indication: labor epidural    Preanesthetic Checklist: patient identified, risks and benefits discussed, anesthesia consent, site marked, patient being monitored, timeout performed and anesthesia consent    Timeout Time: 11:50        Epidural:   Patient position:  Seated  Prep region:  Lumbar  Prep: Patient draped and Chlorhexidine    Location:  L2-3    Needle and Epidural Catheter:   Needle Type:  Tuohy  Needle Gauge:  18 G  Injection Technique:  Loss of resistance using saline  Attempts:  1  Catheter at Skin Depth (cm):  15  Depth in Epidural Space (cm):  9  Events: no blood with aspiration, no cerebrospinal fluid with aspiration, no paresthesia and negative aspiration test    Test Dose:  Negative    Assessment:   Catheter Secured:  Tegaderm and tape  Insertion:  Uncomplicated  Patient tolerance:  Patient tolerated the procedure well with no immediate complications

## 2020-11-18 NOTE — PROGRESS NOTES
OB PROGRESS NOTE    PROBLEM:  Pregnancy at 39-3/7-week gestation  Gestational diabetes, diet controlled  Meconium in amniotic fluid    SUBJECTIVE: Patient is doing well, has no complaints.     VITALS:  Visit Vitals  BP (!) 119/54   Pulse 81   Temp 98.5 °F (36.9 °C)   Resp 16   Ht 5' 2\" (1.575 m)   Wt 129.3 kg (285 lb)   SpO2 99%   BMI 52.13 kg/m²     HEART: Regular rhythm, no murmur  LUNGS: Clear to auscultation at both fields  ABDOMEN: Gravid, fetal heart tones present  PELVIC: Cervical dilation: 2 cm, effacement: 80%, Station: -3, presentation: Vertex, membranes: Artificially ruptured, meconium amniotic fluid noted      LABS:  Recent Results (from the past 24 hour(s))   TYPE & SCREEN    Collection Time: 11/17/20  4:45 PM   Result Value Ref Range    Crossmatch Expiration 11/20/2020,2359     ABO/Rh(D) A Positive     Antibody screen Negative    URINALYSIS W/ REFLEX CULTURE    Collection Time: 11/17/20  4:45 PM    Specimen: Urine   Result Value Ref Range    Color Yellow/Straw      Appearance Turbid (A) Clear      Specific gravity 1.012 1.003 - 1.030      pH (UA) 6.0 5.0 - 8.0      Protein Negative Negative mg/dL    Glucose Negative Negative mg/dL    Ketone Negative Negative mg/dL    Bilirubin Negative Negative      Blood Negative Negative      Urobilinogen 0.1 0.1 - 1.0 EU/dL    Nitrites Negative Negative      Leukocyte Esterase Large (A) Negative      UA:UC IF INDICATED Culture not indicated by UA result Culture not indicated by UA result      WBC 5-10 0 - 4 /hpf    RBC 0-5 0 - 5 /hpf    Bacteria Negative Negative /hpf    Mucus Trace /lpf   CBC WITH AUTOMATED DIFF    Collection Time: 11/17/20  5:00 PM   Result Value Ref Range    WBC 12.6 (H) 3.6 - 11.0 K/uL    RBC 4.59 3.80 - 5.20 M/uL    HGB 13.4 11.5 - 16.0 g/dL    HCT 41.0 35.0 - 47.0 %    MCV 89.3 80.0 - 99.0 FL    MCH 29.2 26.0 - 34.0 PG    MCHC 32.7 30.0 - 36.5 g/dL    RDW 16.8 (H) 11.5 - 14.5 %    PLATELET 544 785 - 229 K/uL    MPV 10.6 8.9 - 12.9 FL NEUTROPHILS 74 32 - 75 %    LYMPHOCYTES 17 12 - 49 %    MONOCYTES 7 5 - 13 %    EOSINOPHILS 1 0 - 7 %    BASOPHILS 0 0 - 1 %    IMMATURE GRANULOCYTES 1 (H) 0.0 - 0.5 %    ABS. NEUTROPHILS 9.5 (H) 1.8 - 8.0 K/UL    ABS. LYMPHOCYTES 2.2 0.8 - 3.5 K/UL    ABS. MONOCYTES 0.9 0.0 - 1.0 K/UL    ABS. EOSINOPHILS 0.1 0.0 - 0.4 K/UL    ABS. BASOPHILS 0.0 0.0 - 0.1 K/UL    ABS. IMM. GRANS. 0.2 (H) 0.00 - 0.04 K/UL    DF AUTOMATED     GLUCOSE, POC    Collection Time: 20  7:37 AM   Result Value Ref Range    Glucose (POC) 89 65 - 100 mg/dL    Performed by Wilder Casey        Fetal monitoring: Category 1    ASSESSMENT: 31years old  with pregnancy at 39-3/7 weeks gestation admitted for labor induction. After overnight Cervidil, there is significant cervical change.     PLAN:  Continuous fetal monitoring  Start Pitocin induction/augmentation

## 2020-11-18 NOTE — PROGRESS NOTES
0708: Received care of Ms. Shields resting in bed and awake. No acute distress noted. Jerome Moya, patient's boyfriend, remains at bedside. Bedside report received from PEDRO LUIS Rogel RN.    0039: Shift Assessment initiated and completed. 0725: IVF: L/R resumed at 125ml/hr via pump, no infiltration noted. 4957: One Touch: 80, asymptomatic.     0745: Dr. Loki Dunaway present. Dr. Loki Dunaway reviewed the patient's OB strip which registered last night up to present time. The writer informed Dr. Loki Dunaway of the patient's One Touch, 80. Verbal order received to obtain a One Touch every 8 hour. 26: Dr. Loki Dunaway in the patient's room. 7521: VE performed by Dr. Loki Dunaway. Cervix: 2/80%/-3. AROM by Dr. Loki Dunaway. Moderate amount of slightly tint meconium stained amniotic fluid. Adelaida-pad changed. The writer reciewved a verbal order to initiate the Pitocin drip at 1ml and increase by 1ml every 15 minutes up to 24ml or active labor. 8427: Pulse Oximeter applied. 0815: Pitocin drip initiated at 1ml as ordered by Dr. Loki Dunaway. 0830: Stadol 1mg given IVP and slowly. Pitocin drip increased to 2ml. 3469: Pitocin drip increased to 3ml.    0910: Pitocin drip increased to 4ml. 0935: Pitocin drip increased to 5ml. 1005: Pitocin drip increased to 6ml. 1037: Pitocin drip increased to 7ml. 1050: Pitocin drip increased to 8ml. 1113: Dr. Loki Dunaway in the patient's room. VE performed by Dr. Loki Dunaway. Cervix: 4cm/80%/-3. Verbal order received from Dr. Loki Dunaway as followed. Patient can have an epidural.    1128: Pitocin drip increased to 9ml.    1131: Stadol 1mg given IVP and slowly for pain level, \"7\". IVF: L/R bolus initiated via the pump prior the the epidural procedure. No infiltration noted. 1140: The writer Latia Esquivel CRNA was informed by the writer of the order for an epidural. Will come.     1145: Latia Esquivel CRNA in the patient's room and interviewing the patient for an epidural.    1149: Patient sitting on the side of the bed prior to the epidural procedure. Consent witnessed and signed. 1150: Time out for the epidural procedure performed. Radames Garcia CRNA, Wicho Pulido RN and SN Felix present. 1153: Patient's back was prepped by Radames Garcia CRNA prior to the epidural.    1155:Epidural procedure initiated. 1158: Epidural catheter placed by Radames Garcia CRNA.     4758: Test dose administered via the epidural catheter by Radames Garcia See Anesthesia Record for medication administered via the epidural catheter. 1203: Patient assisted to bed.    1206: Loading dose administered via the epidural catheter by Radames Garcia CRNA. See the Anesthesia Record for medication administered via the epidural catheter. 1216: Duarte catheter placed, See the Avatar for findings. Urine specimen obtained and sent to the Lab for a C&S to be performed    1225: L/R bolus infused. 1000ml of L/R added and infusing at 125ml/hr via pump, no infiltration noted. 1228:Pitocin drip remained at 9ml. 1250: Epidural drip initiated as followed: Continuous rate 10ml/Demand dose 5ml/ Lockout Interval 10 minutes. Pitocin drip increased to 10ml. 1339: VE performed by Dr. Estevan Steiner. Cervix: 5cm/80%/-3 with moulding noted of the presenting part. 1343: The writer informed Dr. Estevan Steiner and showed Dr. Estevan Steiner of the patient's  subtler late decels. Verbal order received to continue to increase the pitocin drip. Jose Medeiros Pitocin drip increased to 11ml. 1402: Pitocin drip increased to 12ml     1420: Pitocin drip increased to 13ml. 1522: The writer informed Dr. Esteavn Steiner of the late decel and the intervention performed. A verbal order  received  as followed to keep the Pitocin drip at 13ml. 1612: One Touch: 59, asymptomatic     1630: Dr. Estevan Steiner reviewed the patient's OB strip in her room. Aware of the late and early decels. VE performed by Dr. Estevan Steiner. Cervix; 5cm/90%/(-3) to (-2) per Dr. Estevan Steiner. No new orders received.     1900: Report given to the on-coming shift. 1905: Bedside report given to Darwyn Councilman, RN.

## 2020-11-18 NOTE — PROGRESS NOTES
Assume care of patient following report from out going Rn. Patient uncomplaining at this time. 1915Plan of care discussed including expectation for the shift, diet, and pain management. Patients questions answered. 1927: Patient sitting up in bed. Ultrasound adjusted. 1940 Patient sitting up in bed eating diet brought by her s/o.     2305 patient reports leaking of fluid from vagina. Pad put in place. 0350: small amount of clear fluid noted on pad. Nitrazine done same negative. 5815: Cervidil removed, cervix checked same closed. 8515: Patient up to bathroom to shower. 8790:  Bedside shift report given to [facility].

## 2020-11-18 NOTE — H&P
58-year-old black female,  1 para 0, [facility] 2020, estimated gestational age 39-2/7 weeks with prenatal care at [facility] notable for gestational diabetes treated with diet, who presents for cervical ripening and induction of labor. Patient reports occasional contractions, but denies bleeding or leakage of fluid. She reports normal fetal movement. She was diagnosed as being a gestational diabetic, and has been treated with diet to this point. Past Medical History:   Diagnosis Date    Abnormal Papanicolaou smear of cervix     Gestational diabetes     Herpes     Hypercholesterolemia      Past Surgical History:   Procedure Laterality Date    HX COLONOSCOPY N/A     HX TONSIL AND ADENOIDECTOMY        Social History     Socioeconomic History    Marital status: SINGLE     Spouse name: Not on file    Number of children: Not on file    Years of education: Not on file    Highest education level: Associate degree: academic program   Tobacco Use    Smoking status: Never Smoker    Smokeless tobacco: Never Used   Substance and Sexual Activity    Alcohol use: Not Currently    Drug use: Never    Sexual activity: Yes     Partners: Male     Birth control/protection: None   Other Topics Concern    Blood Transfusions No      Family History   Problem Relation Age of Onset    Hypertension Mother     Colon Cancer Mother     Hypertension Father     Colon Cancer Father     Hypertension Paternal Grandmother     Diabetes Paternal Grandfather     Colon Cancer Paternal Grandfather       No current facility-administered medications on file prior to encounter. No current outpatient medications on file prior to encounter. Allergies as of 2020    (No Known Allergies)       Constitutional:   Chaperone: accepted and present. General Appearance: healthy-appearing, well-nourished, and well-developed; black female. Level of Distress: NAD. Ambulation: ambulating normally. Psychiatric:   Insight: good judgement. Mental Status: normal mood and affect and active and alert. Orientation: to time, place, and person. Memory: recent memory normal and remote memory normal.     Head: Head: normocephalic and atraumatic. Neck:   Neck: supple, FROM, trachea midline, and no masses. Lymph Nodes: no cervical LAD, supraclavicular LAD, axillary LAD, or inguinal LAD. Thyroid: no enlargement or nodules and non-tender. Lungs:   Respiratory effort: no dyspnea. Auscultation: no wheezing, rales/crackles, or rhonchi and breath sounds normal, good air movement, and CTA except as noted. Cardiovascular:   Heart Auscultation: normal S1 and S2; no murmurs, rubs, or gallops; and RRR. Pulses including femoral / pedal: normal throughout. Breast: Breast: no masses or abnormal secretions and normal appearance. Abdomen: Bowel Sounds: normal.   Inspection and Palpation: Soft, gravid, nontender no tenderness, guarding, masses, rebound tenderness, or CVA tenderness and non-distended. Liver: non-tender and no hepatomegaly. Spleen: non-tender and no splenomegaly. Hernia: none palpable. Incisions none  Fetal heart tracin beat. Baseline with moderate variability and accelerations  Tocodynamometer: Occasional contractions    Female :   Cervix: Closed    Musculoskeletal[de-identified]   Motor Strength and Tone: normal tone and motor strength. Joints, Bones, and Muscles: no contractures, malalignment, tenderness, or bony abnormalities and normal movement of all extremities. Extremities: no cyanosis, edema, varicosities, or palpable cord. Neurologic:   Gait and Station: normal gait and station. Sensation: grossly intact. Reflexes: DTRs 2+ bilaterally throughout. Skin:   Inspection and palpation: no rash, lesions, ulcer, induration, nodules, jaundice, or abnormal nevi and good turgor. Nails: normal.     Back: Thoracolumbar Appearance: normal curvature.      Patient Active Problem List   Diagnosis Code    Pregnant Z34.90    Pregnancy Z34.90    44 weeks gestation of pregnancy Z3A.39    Diet controlled gestational diabetes mellitus (GDM) in third trimester O24.410         Plan:  Cervical ripening  Induction of labor  Fetal monitoring

## 2020-11-19 PROCEDURE — 59410 OBSTETRICAL CARE: CPT | Performed by: OBSTETRICS & GYNECOLOGY

## 2020-11-19 PROCEDURE — 75410000000 HC DELIVERY VAGINAL/SINGLE

## 2020-11-19 PROCEDURE — 0KQM0ZZ REPAIR PERINEUM MUSCLE, OPEN APPROACH: ICD-10-PCS | Performed by: OBSTETRICS & GYNECOLOGY

## 2020-11-19 PROCEDURE — 65410000002 HC RM PRIVATE OB

## 2020-11-19 PROCEDURE — 74011250637 HC RX REV CODE- 250/637: Performed by: OBSTETRICS & GYNECOLOGY

## 2020-11-19 PROCEDURE — 75410000003 HC RECOV DEL/VAG/CSECN EA 0.5 HR

## 2020-11-19 PROCEDURE — 74011250636 HC RX REV CODE- 250/636: Performed by: OBSTETRICS & GYNECOLOGY

## 2020-11-19 PROCEDURE — 88307 TISSUE EXAM BY PATHOLOGIST: CPT

## 2020-11-19 PROCEDURE — 75410000002 HC LABOR FEE PER 1 HR

## 2020-11-19 RX ORDER — DOCUSATE SODIUM 100 MG/1
100 CAPSULE, LIQUID FILLED ORAL
Status: DISCONTINUED | OUTPATIENT
Start: 2020-11-19 | End: 2020-11-21 | Stop reason: HOSPADM

## 2020-11-19 RX ORDER — HYDROCORTISONE ACETATE PRAMOXINE HCL 2.5; 1 G/100G; G/100G
CREAM TOPICAL AS NEEDED
Status: DISCONTINUED | OUTPATIENT
Start: 2020-11-19 | End: 2020-11-21 | Stop reason: HOSPADM

## 2020-11-19 RX ORDER — ZOLPIDEM TARTRATE 5 MG/1
5 TABLET ORAL
Status: DISCONTINUED | OUTPATIENT
Start: 2020-11-19 | End: 2020-11-21 | Stop reason: HOSPADM

## 2020-11-19 RX ORDER — OXYTOCIN/0.9 % SODIUM CHLORIDE 30/500 ML
95 PLASTIC BAG, INJECTION (ML) INTRAVENOUS AS NEEDED
Status: COMPLETED | OUTPATIENT
Start: 2020-11-19 | End: 2020-11-19

## 2020-11-19 RX ORDER — IBUPROFEN 800 MG/1
800 TABLET ORAL
Status: DISCONTINUED | OUTPATIENT
Start: 2020-11-19 | End: 2020-11-21 | Stop reason: HOSPADM

## 2020-11-19 RX ORDER — NALOXONE HYDROCHLORIDE 0.4 MG/ML
0.4 INJECTION, SOLUTION INTRAMUSCULAR; INTRAVENOUS; SUBCUTANEOUS AS NEEDED
Status: DISCONTINUED | OUTPATIENT
Start: 2020-11-19 | End: 2020-11-21 | Stop reason: HOSPADM

## 2020-11-19 RX ORDER — OXYCODONE AND ACETAMINOPHEN 5; 325 MG/1; MG/1
1 TABLET ORAL
Status: DISCONTINUED | OUTPATIENT
Start: 2020-11-19 | End: 2020-11-21 | Stop reason: HOSPADM

## 2020-11-19 RX ORDER — OXYTOCIN/RINGER'S LACTATE 30/500 ML
10 PLASTIC BAG, INJECTION (ML) INTRAVENOUS AS NEEDED
Status: COMPLETED | OUTPATIENT
Start: 2020-11-19 | End: 2020-11-19

## 2020-11-19 RX ADMIN — IBUPROFEN 800 MG: 800 TABLET, FILM COATED ORAL at 06:08

## 2020-11-19 RX ADMIN — OXYCODONE HYDROCHLORIDE AND ACETAMINOPHEN 1 TABLET: 5; 325 TABLET ORAL at 15:22

## 2020-11-19 RX ADMIN — OXYTOCIN-SODIUM CHLORIDE 0.9% IV SOLN 30 UNIT/500ML 95 MILLI-UNITS/MIN: 30-0.9/5 SOLUTION at 04:13

## 2020-11-19 RX ADMIN — Medication 10000 MILLI-UNITS: at 03:00

## 2020-11-19 RX ADMIN — IBUPROFEN 800 MG: 800 TABLET, FILM COATED ORAL at 23:39

## 2020-11-19 NOTE — PROGRESS NOTES
0830:  Received patient from L and D via bed to room 326 accompanied by Obdulia Joy RN. Mother and [address] brochure , birth certificate info , medication side effects sheet , hourly rounding info and white board information provided and reviewed with patient. Patient oriented to room along with unit procedures. Patient verbalized understanding of information given. Assessment completed- see flowsheet. Call bell in reach. 1300:  Patient out of bed for first time to bathroom. Assist x 1 given. Steady gate noted and denied dizziness/lightheadedness. Voided 500mls clear urine. Adelaida-care done. 1900:  Gave bedside shift report to Khloe Jim RN.

## 2020-11-19 NOTE — PROGRESS NOTES
Called OB/GYN office to request prenatal labs. No prenatal lab results listed on chart. Will await fax. [phone]:  Fax with prenatal labs received. Placed on chart.

## 2020-11-19 NOTE — L&D DELIVERY NOTE
DELIVERY NOTE    PREOPERATIVE DIAGNOSIS:   Intrauterine pregnancy at 39-4/7 weeks gestation  Diet controlled gestational diabetes mellitus in third trimester  Meconium in amniotic fluid    POSTOPERATIVE DIAGNOSIS:   Intrauterine pregnancy at 39-4/7 weeks gestation delivered  Diet controlled gestational diabetes mellitus in third trimester  Meconium in amniotic fluid  Second-degree perineal laceration  Nuchal cord x1    PROCEDURE:  Spontaneous vaginal delivery  Repair of second-degree perineal laceration  Amniotomy  Electronic fetal monitoring    ANESTHESIA: Epidural    ANESTHESIOLOGIST: General Endy CRNA    SURGEON: Jomar Ovalle M.D.    ASSISTANT:N/A    COMPLICATIONS: None    CONDITION DURING PROCEDURE: Stable    ESTIMATED BLOOD LOSS: 245 mL    SURGICAL COUNT:Correct    SPECIMEN: Placenta and cord    FINDINGS: Male Infant, cephalic presentation, ODALYS. Apgar's:8/9/9. Intact placenta. Three-vessel cord. Nuchal cord x1. Second-degree perineal laceration. DESCRIPTION OF PROCEDURE: The patient was admitted for induction of labor. After overnight Cervidil, she was already 2-3 cm dilated. She had artificial rupture of membranes. Pitocin was started and she actually went to complete dilation. The patient pushed for little bit longer and under sterile and controlled conditions had a spontaneous vaginal delivery, ODALYS, of a male infant, cephalic, at 2399. A nuchal cord x1 noted after delivery of baby's head, was easily reduced before delivery of baby's torso. Cord was clamped x2 and cut, and the baby was handed off to waiting nurse. Umbilical cord blood sample taken. Spontaneous and complete delivery of intact placenta and three-vessel cord noted at 0302. A second-degree perineal laceration noted and was repaired with 2-0 chromic. No cervical or vaginal laceration noted. Uterus noted well contracted and not actively bleeding. Mother and baby are both doing well. Mother will go to postpartum.

## 2020-11-19 NOTE — PROGRESS NOTES
0700 Recieved report from night shift. ASSUMED CARE OF THE PT. Awaitng transfer to [facility] Pt. Transferred to [address] 1.3 per wc  delivered. Condition stable, oriented to room and unit, iv credit 150cc pit. Report given to [address] 1.3 staff.

## 2020-11-19 NOTE — PROGRESS NOTES
1900 Assumed care of this patient, ice chips provided. Pt comfortable, awaiting Dr Carly Macias to round. EFM unremarkable    1930 popsicle provided. Last three BP readings on elevated side, will make attending aware. 1940 Dr Carly Macias aware pt comfortable, no pain, efm unremarkable, with variables on contractions and currently tracing with limited variability. Contractions every 2-5 mins with moderate pressure. Aware BP systolic creeping to upper 615Y, but diastolically 71-13. Dr Carly Macias to come to bedside shortly    2000 Dr Papo Mabry at bedside. EFM/contraction pattern reviewed, pitocin rate reviewed. Ve checked 8/100/-2. Orders to keep pitocin rate same, and recheck in 2 hrs. Dr Carly Macias aware writer to place in Trendelenburg, and use labor peanut, aware pt requesting extra meds through epidural, as she has pressed button x 5 in last 90 mins, and still feeling pain to left side. 2010 Dr Sree Rolle aware pt needing extra medication through epidural, and has used PCA x 5 times in last 90 mins. 2017 Dr Sree Rolle at bedside for dosing    2027 Pt assisted to left extreme, trendelenburg, and small peanut placed in between legs    2045 temp rechecked, pt more comfortable    2145 VE rechecked 8.5/100/-1, moderate amount of bloody show present     2150 Pt to right side, peanut removed    2237 Pt to left side, peanut used again, pad changed. Light fluid to pad. BP cuff readjusted, past several readings high, due to pt lying on cuff. 3424 Vergas Karime Mabry in to see pt. VE 9/100/-1. Will recheck in 1-2 hrs  acu check due, pt to have popsicle after    2350 acu check 91.  popsicle offered.      0129 Pt complete, test pushes well    0133 Dr Carly Macias made aware pt ready to start pushing, call back when pt ready for Dr Carly Macias to come into deliver    [phone] Dr Carly Macias notified pt ready for delivery, in room at [phone] Delivery if viable male infant, loose nuchal x 1 to neck    0302 Placenta delivered, path request due to small size of placenta and cord    0340 Per Dr Maricel Naidu - discontinue Blood sugar checks. 0410 Nursery at bedside for assessment    0430 Lunch box provided. 0505 pt still numb to right side. 0600 pt up to bathroom, ibuprofen requested. 0615 Pt voided clear urine, nemesio care complete. Patient passed egress test    0630 Pt back to bed, desires to take nap.     0705 Bedside report given to VAIBHAV Roach RN

## 2020-11-19 NOTE — ANESTHESIA POSTPROCEDURE EVALUATION
* No procedures listed *.    epidural    Anesthesia Post Evaluation        Anesthetic complications: no  Comments: Epidural removed by L and NIKUNJ RN. INITIAL Post-op Vital signs: No vitals data found for the desired time range.

## 2020-11-20 LAB
BASOPHILS # BLD: 0 K/UL (ref 0–0.1)
BASOPHILS NFR BLD: 0 % (ref 0–1)
DIFFERENTIAL METHOD BLD: ABNORMAL
EOSINOPHIL # BLD: 0.1 K/UL (ref 0–0.4)
EOSINOPHIL NFR BLD: 1 % (ref 0–7)
ERYTHROCYTE [DISTWIDTH] IN BLOOD BY AUTOMATED COUNT: 16.9 % (ref 11.5–14.5)
HCT VFR BLD AUTO: 33.4 % (ref 35–47)
HGB BLD-MCNC: 10.6 G/DL (ref 11.5–16)
IMM GRANULOCYTES # BLD AUTO: 0.2 K/UL (ref 0–0.04)
IMM GRANULOCYTES NFR BLD AUTO: 1 % (ref 0–0.5)
LYMPHOCYTES # BLD: 2.5 K/UL (ref 0.8–3.5)
LYMPHOCYTES NFR BLD: 16 % (ref 12–49)
MCH RBC QN AUTO: 28.6 PG (ref 26–34)
MCHC RBC AUTO-ENTMCNC: 31.7 G/DL (ref 30–36.5)
MCV RBC AUTO: 90.3 FL (ref 80–99)
MONOCYTES # BLD: 1.1 K/UL (ref 0–1)
MONOCYTES NFR BLD: 7 % (ref 5–13)
NEUTS SEG # BLD: 11.9 K/UL (ref 1.8–8)
NEUTS SEG NFR BLD: 75 % (ref 32–75)
PLATELET # BLD AUTO: 265 K/UL (ref 150–400)
PMV BLD AUTO: 10.5 FL (ref 8.9–12.9)
RBC # BLD AUTO: 3.7 M/UL (ref 3.8–5.2)
WBC # BLD AUTO: 15.6 K/UL (ref 3.6–11)

## 2020-11-20 PROCEDURE — 36415 COLL VENOUS BLD VENIPUNCTURE: CPT

## 2020-11-20 PROCEDURE — 65410000002 HC RM PRIVATE OB

## 2020-11-20 PROCEDURE — 85025 COMPLETE CBC W/AUTO DIFF WBC: CPT

## 2020-11-20 PROCEDURE — 74011250637 HC RX REV CODE- 250/637: Performed by: OBSTETRICS & GYNECOLOGY

## 2020-11-20 RX ADMIN — DOCUSATE SODIUM 100 MG: 100 CAPSULE, LIQUID FILLED ORAL at 21:34

## 2020-11-20 RX ADMIN — IBUPROFEN 800 MG: 800 TABLET, FILM COATED ORAL at 18:41

## 2020-11-20 RX ADMIN — IBUPROFEN 800 MG: 800 TABLET, FILM COATED ORAL at 08:30

## 2020-11-20 NOTE — PROGRESS NOTES
Post-Partum Day Number 1 Progress Note    Michael Citizen Beaumont       Information for the patient's :  Susan Stroud [[id number]]   Vaginal, Spontaneous      Patient doing well without significant complaint. Breast feeding. Light lochia. OOB and ambulating. Voiding without difficulty. Tolerating reg diet. Review of Systems   Constitutional: Negative for chills and fever. Respiratory: Negative for shortness of breath. Cardiovascular: Negative for chest pain. Gastrointestinal: Negative for nausea and vomiting. Genitourinary: Negative for dysuria. Vitals:  Visit Vitals  /62 (BP 1 Location: Right arm, BP Patient Position: At rest)   Pulse 96   Temp 98.1 °F (36.7 °C)   Resp 18   Ht 5' 2\" (1.575 m)   Wt 129.3 kg (285 lb)   SpO2 96%   Breastfeeding Unknown   BMI 52.13 kg/m²     Temp (24hrs), Av.2 °F (36.8 °C), Min:98.1 °F (36.7 °C), Max:98.4 °F (36.9 °C)      Current Facility-Administered Medications:     ibuprofen (MOTRIN) tablet 800 mg, 800 mg, Oral, Q8H PRN, Garrett Coffey MD, 800 mg at 20 0830    oxyCODONE-acetaminophen (PERCOCET) 5-325 mg per tablet 1 Tab, 1 Tab, Oral, Q4H PRN, Juan Jose Coffey MD, 1 Tab at 20 1522    naloxone (NARCAN) injection 0.4 mg, 0.4 mg, IntraVENous, PRN, Juan Jose Coffey MD    docusate sodium (COLACE) capsule 100 mg, 100 mg, Oral, DAILY PRN, Juan Jose Coffey MD    zolpidem (AMBIEN) tablet 5 mg, 5 mg, Oral, QHS PRN, Jasmyne Lull, MD    witch hazel-glycerin (hamamel) (TUCKS) pads 1 Pad, 1 Pad, PeriANAL, PRN, Jasmyne Cantu MD    hydrocortisone-pramoxine [facility]) 2.5-1 % (4g) cream, , Topical, PRN, Jasmyne Cantu MD    Exam:   Patient without distress. Abdomen soft, fundus firm 1FB below umb, nontender                Normal resp effort                Lower extremities are negative for swelling, cords or tenderness.       Labs:     Lab Results   Component Value Date/Time WBC 12.6 (H) 11/17/2020 05:00 PM    HGB 13.4 11/17/2020 05:00 PM    HGB 10.8 (L) 08/13/2020 09:05 AM    HCT 41.0 11/17/2020 05:00 PM    PLATELET 865 73/46/5993 05:00 PM       No results found for this or any previous visit (from the past 24 hour(s)). Assessment: Doing well, post partum day 1  GDMA1    Plan:  1. Continue routine postpartum and perineal care as well as maternal education. 2. GDMA1- FSGs were nml and thus testing d/c'ed by admititng physician. 3. Discharge planning: d/c home tomorrow.

## 2020-11-20 NOTE — LACTATION NOTE
This note was copied from a baby's chart. Mother nursing first baby. She states the baby latches and nurses well. She has a Lasinoh pump at home. No questions or request for assistance at this time.

## 2020-11-21 VITALS
DIASTOLIC BLOOD PRESSURE: 76 MMHG | SYSTOLIC BLOOD PRESSURE: 145 MMHG | HEART RATE: 77 BPM | WEIGHT: 285 LBS | TEMPERATURE: 98.2 F | RESPIRATION RATE: 18 BRPM | OXYGEN SATURATION: 98 % | HEIGHT: 62 IN | BODY MASS INDEX: 52.44 KG/M2

## 2020-11-21 PROBLEM — O24.410 DIET CONTROLLED GESTATIONAL DIABETES MELLITUS (GDM) IN THIRD TRIMESTER: Status: RESOLVED | Noted: 2020-11-17 | Resolved: 2020-11-21

## 2020-11-21 PROBLEM — Z3A.39 39 WEEKS GESTATION OF PREGNANCY: Status: RESOLVED | Noted: 2020-11-17 | Resolved: 2020-11-21

## 2020-11-21 PROCEDURE — 74011250637 HC RX REV CODE- 250/637: Performed by: OBSTETRICS & GYNECOLOGY

## 2020-11-21 RX ORDER — IBUPROFEN 800 MG/1
800 TABLET ORAL
Qty: 60 TAB | Refills: 0 | Status: SHIPPED | OUTPATIENT
Start: 2020-11-21

## 2020-11-21 RX ORDER — PRAMOXINE HYDROCHLORIDE 10 MG/G
AEROSOL, FOAM TOPICAL
Status: DISCONTINUED | OUTPATIENT
Start: 2020-11-21 | End: 2020-11-21 | Stop reason: HOSPADM

## 2020-11-21 RX ADMIN — PRAMOXINE HYDROCHLORIDE: 10 AEROSOL, FOAM TOPICAL at 01:49

## 2020-11-21 RX ADMIN — OXYCODONE HYDROCHLORIDE AND ACETAMINOPHEN 1 TABLET: 5; 325 TABLET ORAL at 01:26

## 2020-11-21 RX ADMIN — IBUPROFEN 800 MG: 800 TABLET, FILM COATED ORAL at 10:08

## 2020-11-21 NOTE — DISCHARGE SUMMARY
Obstetrical Discharge Summary     Name: Lele Glez MRN: [id number]  SSN: [id number]    YOB: 1990  Age: 31 y.o. Sex: female      Admit Date: 2020    Discharge Date: 2020     Admitting Physician: Caitlny Lewis MD     Attending Physician:  Andrew Cota    Admission Diagnoses: Pregnancy [Z34.90]    Discharge Diagnoses:   Information for the patient's :  Thurnell Dry [[id number]]   Delivery of a 2.99 kg male infant via Vaginal, Spontaneous on 2020 at 2:50 AM  by Caitlyn Lewis. Apgars were 8  and 9 . Additional Diagnoses:   Hospital Problems  Date Reviewed: 2020          Codes Class Noted POA    Vaginal delivery ICD-10-CM: O80  ICD-9-CM: 950  2020 No           [facility] results found for: [facility] Course: Patient presented for IOL and had a vaginal delivery with Dr Andrew oCta on 2020. She had an uncomplicated PP course. She was deemed as stable to discharge to home on PPD#2. Disposition: Home  Condition: Good    Patient Instructions:   Reference my discharge instructions.     Follow-up Appointments   Procedures    FOLLOW UP VISIT Appointment in: 6 Weeks PP visit     PP visit     Standing Status:   Standing     Number of Occurrences:   1     Order Specific Question:   Appointment in     Answer:   6 Weeks        Signed By:  Rose Mitchell MD     2020

## 2020-11-21 NOTE — PROGRESS NOTES
Post-Partum Day Number 2 Progress Note    Joseph Olivo Sanford       Information for the patient's :  Zaki Leon [[id number]]   Vaginal, Spontaneous    Patient doing well without significant complaint. Breast/bottle feeding. Light lochia. OOB and ambulating. Voiding without difficulty. Tolerating reg diet. Vitals:  Visit Vitals  BP (!) 145/76 (BP 1 Location: Right arm)   Pulse 77   Temp 98.2 °F (36.8 °C)   Resp 18   Ht 5' 2\" (1.575 m)   Wt 129.3 kg (285 lb)   SpO2 98%   Breastfeeding Unknown   BMI 52.13 kg/m²     Temp (24hrs), Av.4 °F (36.9 °C), Min:97.9 °F (36.6 °C), Max:99 °F (37.2 °C)        Exam:   Patient without distress. Abdomen soft, fundus firm @ umb, nontender                Normal resp effort                Lower extremities are negative for cords or tenderness; slight edema, non pitting b/l    Labs:     Lab Results   Component Value Date/Time    WBC 15.6 (H) 2020 12:20 PM    WBC 12.6 (H) 2020 05:00 PM    HGB 10.6 (L) 2020 12:20 PM    HGB 13.4 2020 05:00 PM    HGB 10.8 (L) 2020 09:05 AM    HCT 33.4 (L) 2020 12:20 PM    HCT 41.0 2020 05:00 PM    PLATELET 402  12:20 PM    PLATELET 341  05:00 PM       Recent Results (from the past 24 hour(s))   CBC WITH AUTOMATED DIFF    Collection Time: 20 12:20 PM   Result Value Ref Range    WBC 15.6 (H) 3.6 - 11.0 K/uL    RBC 3.70 (L) 3.80 - 5.20 M/uL    HGB 10.6 (L) 11.5 - 16.0 g/dL    HCT 33.4 (L) 35.0 - 47.0 %    MCV 90.3 80.0 - 99.0 FL    MCH 28.6 26.0 - 34.0 PG    MCHC 31.7 30.0 - 36.5 g/dL    RDW 16.9 (H) 11.5 - 14.5 %    PLATELET 347 899 - 329 K/uL    MPV 10.5 8.9 - 12.9 FL    NEUTROPHILS 75 32 - 75 %    LYMPHOCYTES 16 12 - 49 %    MONOCYTES 7 5 - 13 %    EOSINOPHILS 1 0 - 7 %    BASOPHILS 0 0 - 1 %    IMMATURE GRANULOCYTES 1 (H) 0.0 - 0.5 %    ABS. NEUTROPHILS 11.9 (H) 1.8 - 8.0 K/UL    ABS. LYMPHOCYTES 2.5 0.8 - 3.5 K/UL    ABS.  MONOCYTES 1.1 (H) 0.0 - 1.0 K/UL ABS. EOSINOPHILS 0.1 0.0 - 0.4 K/UL    ABS. BASOPHILS 0.0 0.0 - 0.1 K/UL    ABS. IMM. GRANS. 0.2 (H) 0.00 - 0.04 K/UL    DF AUTOMATED         Assessment: Doing well, post partum day 2      Plan:  1. Continue routine postpartum and perineal care as well as maternal education. 2. Discharge planning: d/c home.

## 2020-11-21 NOTE — DISCHARGE INSTRUCTIONS
Patient Education   Patient Education   Patient Education   Patient Education        Postpartum: Care Instructions  Your Care Instructions  After childbirth (postpartum period), your body goes through many changes. Some of these changes happen over several weeks. In the hours after delivery, your body will begin to recover from childbirth while it prepares to breastfeed your . You may feel emotional during this time. Your hormones can shift your mood without warning for no clear reason. In the first couple of weeks after childbirth, many women have emotions that change from happy to sad. You may find it hard to sleep. You may cry a lot. This is called the \"baby blues. \" These overwhelming emotions often go away within a couple of days or weeks. But it's important to discuss your feelings with your doctor. It is easy to get too tired and overwhelmed during the first weeks after childbirth. Don't try to do too much. Get rest whenever you can, accept help from others, and eat well and drink plenty of fluids. In the first couple of weeks after giving birth, your doctor or midwife may want to check in with you and make a plan for any follow-up care you may need. You will likely have a complete postpartum visit in the first 3 months after delivery. At that time, your doctor or midwife will check on your recovery from childbirth. He or she will also see how you are doing with your emotions and talk about your concerns or questions. Follow-up care is a key part of your treatment and safety. Be sure to make and go to all appointments, and call your doctor if you are having problems. It's also a good idea to know your test results and keep a list of the medicines you take. How can you care for yourself at home? · Sleep or rest when your baby sleeps. · Get help with household chores from family or friends, if you can. Do not try to do it all yourself.   · If you have hemorrhoids or swelling or pain around the opening of your vagina, try using cold and heat. You can put ice or a cold pack on the area for 10 to 20 minutes at a time. Put a thin cloth between the ice and your skin. Also try sitting in a few inches of warm water (sitz bath) 3 times a day and after bowel movements. · Take pain medicines exactly as directed. ? If the doctor gave you a prescription medicine for pain, take it as prescribed. ? If you are not taking a prescription pain medicine, ask your doctor if you can take an over-the-counter medicine. · Eat more fiber to avoid constipation. Include foods such as whole-grain breads and cereals, raw vegetables, raw and dried fruits, and beans. · Drink plenty of fluids, enough so that your urine is light yellow or clear like water. If you have kidney, heart, or liver disease and have to limit fluids, talk with your doctor before you increase the amount of fluids you drink. · Do not rinse inside your vagina with fluids (douche). · If you have stitches, keep the area clean by pouring or spraying warm water over the area outside your vagina and anus after you use the toilet. · Keep a list of questions to ask your doctor or midwife. Your questions might be about:  ? Changes in your breasts, such as lumps or soreness. ? When to expect your menstrual period to start again. ? What form of birth control is best for you. ? Weight you have put on during the pregnancy. ? Exercise options. ? What foods and drinks are best for you, especially if you are breastfeeding. ? Problems you might be having with breastfeeding. ? When you can have sex. Some women may want to talk about lubricants for the vagina. ? Any feelings of sadness or restlessness that you are having. When should you call for help? Call 911 anytime you think you may need emergency care.  For example, call if:    · You have thoughts of harming yourself, your baby, or another person.     · You passed out (lost consciousness).     · You have chest pain, are short of breath, or cough up blood.     · You have a seizure. Call your doctor now or seek immediate medical care if:    · Your vaginal bleeding seems to be getting heavier.     · You are dizzy or lightheaded, or you feel like you may faint.     · You have a fever.     · You have new or more belly pain.     · You have symptoms of a blood clot in your leg (called a deep vein thrombosis), such as:  ? Pain in the calf, back of the knee, thigh, or groin. ? Redness and swelling in your leg or groin.     · You have signs of preeclampsia, such as:  ? Sudden swelling of your face, hands, or feet. ? New vision problems (such as dimness, blurring, or seeing spots). ? A severe headache. Watch closely for changes in your health, and be sure to contact your doctor if:    · You have new or worse vaginal discharge.     · You feel sad or depressed.     · You are having problems with your breasts or breastfeeding. Where can you learn more? Go to http://www.gray.com/  Enter S647 in the search box to learn more about \"Postpartum: Care Instructions. \"  Current as of: February 11, 2020               Content Version: 12.6  © 0666-8690 [employer]. Care instructions adapted under license by [employer] (which disclaims liability or warranty for this information). If you have questions about a medical condition or this instruction, always ask your healthcare professional. [place] [address] any warranty or liability for your use of this information. Depression After Childbirth: Care Instructions  Your Care Instructions     Many women get the \"baby blues\" during the first few days after childbirth. You may lose sleep, feel irritable, and cry easily. You may feel happy one minute and sad the next. Hormone changes are one cause of these emotional changes.  Also, the demands of a new baby, along with visits from relatives or other family needs, add to a mother's stress. The \"baby blues\" often peak around the fourth day. Then they ease up in less than 2 weeks. If your moodiness or anxiety lasts for more than 2 weeks, or if you feel like life is not worth living, you may have postpartum depression. This is different for each mother. Some mothers with serious depression may worry intensely about their infant's well-being. Others may feel distant from their child. Some mothers might even feel that they might harm their baby. A mother may have signs of paranoia, wondering if someone is watching her. Depression is not a sign of weakness. It is a medical condition that requires treatment. Medicine and counseling often work well to reduce depression. Talk to your doctor about taking antidepressant medicine while breastfeeding. Follow-up care is a key part of your treatment and safety. Be sure to make and go to all appointments, and call your doctor if you are having problems. It's also a good idea to know your test results and keep a list of the medicines you take. How do you know if you are depressed? With all the changes in your life, you may not know if you are depressed. Pregnancy sometimes causes changes in how you feel that are similar to the symptoms of depression. Symptoms of depression include:  · Feeling sad or hopeless and losing interest in daily activities. These are the most common symptoms of depression. · Sleeping too much or not enough. · Feeling tired. You may feel as if you have no energy. · Eating too much or too little. · Writing or talking about death, such as writing suicide notes or talking about guns, knives, or pills. Keep the numbers for these national suicide hotlines: 5-924-813-TALK (0-568.170.4788) and 2-727-BGNOZTR (3-655.490.5465). If you or someone you know talks about suicide or feeling hopeless, get help right away. How can you care for yourself at home? · Be safe with medicines. Take your medicines exactly as prescribed. Call your doctor if you think you are having a problem with your medicine. · Eat a healthy diet so that you can keep up your energy. · Get regular daily exercise, such as walks, to help improve your mood. · Get as much sunlight as possible. Keep your shades and curtains open. Get outside as much as you can. · Avoid using alcohol or other substances to feel better. · Get as much rest and sleep as possible. Avoid doing too much. Being too tired can increase depression. · Play stimulating music throughout your day and soothing music at night. · Schedule outings and visits with friends and family. Ask them to call you regularly, so that you do not feel alone. · Ask for help with preparing food and other daily tasks. Family and friends are often happy to help a mother with a . · Be honest with yourself and those who care about you. Tell them about your struggle. · Join a support group of new mothers. No one can better understand the challenges of caring for a  than other new mothers. · If you feel like life is not worth living or are feeling hopeless, get help right away. Keep the numbers for these national suicide hotlines: 7-696-707-TALK (2-054-904-765.499.7804) and 9-254-IORPMTJ (2-769.607.7913). When should you call for help? Call 911 anytime you think you may need emergency care. For example, call if:    · You feel you cannot stop from hurting yourself, your baby, or someone else. Call your doctor now or seek immediate medical care if:    · You are having trouble caring for yourself or your baby.     · You hear voices. Watch closely for changes in your health, and be sure to contact your doctor if:    · You have problems with your depression medicine.     · You do not get better as expected. Where can you learn more? Go to http://migel-uche.info/  Enter [id number] in the search box to learn more about \"Depression After Childbirth: Care Instructions. \"  Current as of: January 31, 2020               Content Version: 12.6  © 2006-2020 [employer]. Care instructions adapted under license by [employer] (which disclaims liability or warranty for this information). If you have questions about a medical condition or this instruction, always ask your healthcare professional. [address] any warranty or liability for your use of this information. Constipation: Care Instructions  Your Care Instructions     Constipation means that you have a hard time passing stools (bowel movements). People pass stools from 3 times a day to once every 3 days. What is normal for you may be different. Constipation may occur with pain in the rectum and cramping. The pain may get worse when you try to pass stools. Sometimes there are small amounts of bright red blood on toilet paper or the surface of stools. This is because of enlarged veins near the rectum (hemorrhoids). A few changes in your diet and lifestyle may help you avoid ongoing constipation. Your doctor may also prescribe medicine to help loosen your stool. Some medicines can cause constipation. These include pain medicines and antidepressants. Tell your doctor about all the medicines you take. Your doctor may want to make a medicine change to ease your symptoms. Follow-up care is a key part of your treatment and safety. Be sure to make and go to all appointments, and call your doctor if you are having problems. It's also a good idea to know your test results and keep a list of the medicines you take. How can you care for yourself at home? · Drink plenty of fluids, enough so that your urine is light yellow or clear like water. If you have kidney, heart, or liver disease and have to limit fluids, talk with your doctor before you increase the amount of fluids you drink. · Include high-fiber foods in your diet each day. These include fruits, vegetables, beans, and whole grains.   · Get at least 30 minutes of exercise on most days of the week. Walking is a good choice. You also may want to do other activities, such as running, swimming, cycling, or playing tennis or team sports. · Take a fiber supplement, such as Citrucel or Metamucil, every day. Read and follow all instructions on the label. · Schedule time each day for a bowel movement. A daily routine may help. Take your time having your bowel movement. · Support your feet with a small step stool when you sit on the toilet. This helps flex your hips and places your pelvis in a squatting position. · Your doctor may recommend an over-the-counter laxative to relieve your constipation. Examples are Milk of Magnesia and MiraLax. Read and follow all instructions on the label. Do not use laxatives on a long-term basis. When should you call for help? Call your doctor now or seek immediate medical care if:    · You have new or worse belly pain.     · You have new or worse nausea or vomiting.     · You have blood in your stools. Watch closely for changes in your health, and be sure to contact your doctor if:    · Your constipation is getting worse.     · You do not get better as expected. Where can you learn more? Go to http://www.BearTail.com/  Enter P343 in the search box to learn more about \"Constipation: Care Instructions. \"  Current as of: June 26, 2019               Content Version: 12.6  © 3100-5428 [employer]. Care instructions adapted under license by [employer] (which disclaims liability or warranty for this information). If you have questions about a medical condition or this instruction, always ask your healthcare professional. [place] [address] any warranty or liability for your use of this information. Breastfeeding: Care Instructions  Overview     Breastfeeding has many benefits. It may lower your baby's chances of getting an infection.  It also may make it less likely that your baby will have problems such as diabetes and obesity later in life. Breastfeeding also helps you bond with your baby. In the first days after birth, your breasts make a thick, yellow liquid called colostrum. This liquid gives your baby nutrients and antibodies against infection. It is all that babies need in the first days after birth. Your breasts will fill with milk a few days after the birth. Breastfeeding is a skill that gets better with practice. Be patient with yourself and your baby. If you have trouble, you can get help and keep breastfeeding. Follow-up care is a key part of your treatment and safety. Be sure to make and go to all appointments, and call your doctor if you are having problems. It's also a good idea to know your test results and keep a list of the medicines you take. How can you care for yourself at home? · Breastfeed your baby whenever he or she is hungry. In the first 2 weeks, your baby will breastfeed at least 8 times in a 24-hour period. This will help you keep up your supply of milk. Signs that your baby is hungry include:  ? Sucking on his or her hands. ? [place] his or her lips. ? Turning his or her head toward your breast.  · Put a bed pillow or a nursing pillow on your lap to support your arms and your baby. · Hold your baby in a comfortable position. ? You can hold your baby in several ways. One of the most common positions is the cradle hold. One arm supports your baby, with his or her head in the bend of your elbow. Your open hand supports your baby's bottom or back. Your baby's belly lies against yours. ? If you had your baby by , or , try the football hold. This position keeps your baby off your belly. Tuck your baby under your arm, with his or her body along the side you will be feeding on. Support your baby's upper body with your arm.  With that hand you can control your baby's head to bring his or her mouth to your breast.  ? Try different positions with each feeding. If you are having problems, ask for help from your doctor or a lactation consultant. · To get your baby to latch on:  ? Support and narrow your breast with one hand using a \"U hold,\" with your thumb on the outer side of your breast and your fingers on the inner side. You can also use a \"C hold,\" with all your fingers below the nipple and your thumb above it. Try the different holds to get the deepest latch for whichever breastfeeding position you use. Your other arm is behind your baby's back, with your hand supporting the base of the baby's head. Position your fingers and thumb to point toward your baby's ears. ? You can touch your baby's lower lip with your nipple to get your baby to open his or her mouth. Wait until your baby opens up really wide, like a big yawn. Then be sure to bring the baby quickly to your breast--not your breast to the baby. As you bring your baby toward your breast, use your other hand to support the breast and guide it into his or her mouth. ? Both the nipple and a large portion of the darker area around the nipple (areola) should be in the baby's mouth. The baby's lips should be flared outward, not folded in (inverted). ? Listen for a regular sucking and swallowing pattern while the baby is feeding. If you cannot see or hear a swallowing pattern, watch the baby's ears, which will wiggle slightly when the baby swallows. If the baby's nose appears to be blocked by your breast, bring your baby's body closer to you. This will help tilt the baby's head back slightly, so just the edge of one nostril is clear for breathing. ? When your baby is latched, you can usually remove your hand from supporting your breast and bring it under your baby to cradle him or her. Now just relax and breastfeed your baby. · You will know that your baby is feeding well when:  ? His or her mouth covers a lot of the areola, and the lips are flared out.  ?  His or her chin and nose rest against your breast.  ? Sucking is deep and rhythmic, with short pauses. ? You are able to see and hear your baby swallowing. ? You do not feel pain in your nipple. · Offer both breasts to your baby at each feeding. Each time you breastfeed, switch which breast you start with. · Anytime you need to remove your baby from the breast, put one finger in the corner of his or her mouth. Push your finger between your baby's gums to gently break the seal. If you do not break the tight seal before you remove your baby, your nipples can become sore, cracked, or bruised. · After feeding your baby, gently pat his or her back to let out any swallowed air. After your baby burps, offer the breast again, or offer the other breast. Sometimes a baby will want to keep feeding after being burped. When should you call for help? Call your doctor now or seek immediate medical care if:    · You have symptoms of a breast infection, such as:  ? Increased pain, swelling, redness, or warmth around a breast.  ? Red streaks extending from the breast.  ? Pus draining from a breast.  ? A fever.     · Your baby has no wet diapers for 6 hours. Watch closely for changes in your health, and be sure to contact your doctor if:    · Your baby has trouble latching on to your breast.     · You continue to have pain or discomfort when breastfeeding.     · You have other questions or concerns. Where can you learn more? Go to http://www.gray.com/  Enter P492 in the search box to learn more about \"Breastfeeding: Care Instructions. \"  Current as of: February 11, 2020               Content Version: 12.6  © 9492-5686 [employer]. Care instructions adapted under license by [employer] (which disclaims liability or warranty for this information).  If you have questions about a medical condition or this instruction, always ask your healthcare professional. [place] [address] any warranty or liability for your use of this information.

## 2020-11-21 NOTE — PROGRESS NOTES
Discharge instructions given to patient, prescription for motrin sent to patients pharmacy. Discharge plan of care/case management plan validated with provider discharge order. Discharged patient home via wheelchair to to private vehicle with mother, baby in [address]. Pt in stable condition.

## 2020-11-21 NOTE — PROGRESS NOTES
Assumed care of pt following report. Pt in chair bonding with infant under bililights. No needs expressed. 1940- Rounded. Pt in chair bonding with infant. Assessment complete. No needs expressed at this time. S.o at bedside. 2035- Rounded. Pt up in room. Jamal breast pump given at this time. No other needs expressed. 2135- Rounded. Pt request Colace at this time. Apple juice also provided. No other needs expressed. S.o at bedside. 2235- Rounded. Pt resting in bed with eyes closed. Respirations even and unlabored. S.o at bedside. 2310-Rounded. Pt resting in bed. V/S obtained. No needs expressed. S.o at bedside. 0125- Rounded. Pt resting in bed. Pt c/o perineum pain 5/10. 1 Percocet p.o given. Ice water provided. No other needs expressed. 0325- Rounded. Pt resting in bed with eyes closed. Respirations even and unlabored. S.o at bedside. 0530- Rounded. Pt resting in bed. Pt denies pain or needs at this time. 1679- Bedside shift report given to YA Pedro

## 2021-01-05 ENCOUNTER — OFFICE VISIT (OUTPATIENT)
Dept: OBGYN CLINIC | Age: 31
End: 2021-01-05
Payer: COMMERCIAL

## 2021-01-05 DIAGNOSIS — O24.419 GESTATIONAL DIABETES MELLITUS (GDM), ANTEPARTUM, GESTATIONAL DIABETES METHOD OF CONTROL UNSPECIFIED: ICD-10-CM

## 2021-01-05 DIAGNOSIS — N94.10 DYSPAREUNIA IN FEMALE: ICD-10-CM

## 2021-01-05 PROCEDURE — 0503F POSTPARTUM CARE VISIT: CPT | Performed by: OBSTETRICS & GYNECOLOGY

## 2021-01-05 NOTE — PROGRESS NOTES
Geronimo Monet is a 31 y.o. female who presents today for the following:  No chief complaint on file. No Known Allergies    Current Outpatient Medications   Medication Sig    ibuprofen (MOTRIN) 800 mg tablet Take 1 Tab by mouth every eight (8) hours as needed for Pain. No current facility-administered medications for this visit.         Past Medical History:   Diagnosis Date    Abnormal Papanicolaou smear of cervix     Gestational diabetes     Herpes     Hypercholesterolemia        Past Surgical History:   Procedure Laterality Date    HX COLONOSCOPY N/A     HX TONSIL AND ADENOIDECTOMY         Family History   Problem Relation Age of Onset    Hypertension Mother     Colon Cancer Mother     Hypertension Father     Colon Cancer Father     Hypertension Paternal Grandmother     Diabetes Paternal Grandfather     Colon Cancer Paternal Grandfather        Social History     Socioeconomic History    Marital status: SINGLE     Spouse name: Not on file    Number of children: Not on file    Years of education: Not on file    Highest education level: Associate degree: academic program   Occupational History    Not on file   Social Needs    Financial resource strain: Not on file    Food insecurity     Worry: Not on file     Inability: Not on file   [employer] needs     Medical: Not on file     Non-medical: Not on file   Tobacco Use    Smoking status: Never Smoker    Smokeless tobacco: Never Used   Substance and Sexual Activity    Alcohol use: Not Currently    Drug use: Never    Sexual activity: Yes     Partners: Male     Birth control/protection: None   Lifestyle    Physical activity     Days per week: Not on file     Minutes per session: Not on file    Stress: Not on file   Relationships    Social connections     Talks on phone: Not on file     Gets together: Not on file     Attends [employer] service: Not on file     Active member of club or organization: Not on file     Attends meetings of clubs or organizations: Not on file     Relationship status: Not on file    Intimate partner violence     Fear of current or ex partner: Not on file     Emotionally abused: Not on file     Physically abused: Not on file     Forced sexual activity: Not on file   Other Topics Concern     Service Not Asked    Blood Transfusions No    Caffeine Concern Not Asked    Occupational Exposure Not Asked    Hobby Hazards Not Asked    Sleep Concern Not Asked    Stress Concern Not Asked    Weight Concern Not Asked    Special Diet Not Asked    Back Care Not Asked    Exercise Not Asked    Bike Helmet Not Asked   [address] Not Asked    Self-Exams Not Asked   Social History Narrative    Not on file         ROS   Review of Systems   Constitutional: Negative. HENT: Negative. Eyes: Negative. Respiratory: Negative. Cardiovascular: Negative. Gastrointestinal: Negative. Genitourinary: Negative. Musculoskeletal: Negative. Skin: Negative. Neurological: Negative. Endo/Heme/Allergies: Negative. Psychiatric/Behavioral: Negative. There were no vitals taken for this visit. OBGyn Exam   Constitutional  · Appearance: well-nourished, well developed, alert, in no acute distress    HENT  · Head and Face: appears normal    Neck  · Inspection/Palpation: normal appearance, no masses or tenderness  · Lymph Nodes: no lymphadenopathy present  · Thyroid: gland size normal, nontender, no nodules or masses present on palpation    Breasts   Symmetric, no palpable masses, no tenderness, no skin changes, no nipple abnormality, no nipple discharge, no lymphadenopathy.     Chest  · Respiratory Effort: breathing labored  · Auscultation: normal breath sounds    Cardiovascular  · Heart:  · Auscultation: regular rate and rhythm without murmur    Gastrointestinal  · Abdominal Examination: abdomen non-tender to palpation, normal bowel sounds, no masses present  · Liver and spleen: no hepatomegaly present, spleen not palpable  · Hernias: no hernias identified    Genitourinary  · External Genitalia: normal appearance for age, no discharge present, no tenderness present, no inflammatory lesions present, no masses present, no atrophy present  · Vagina: normal vaginal vault without central or paravaginal defects, no discharge present, no inflammatory lesions present, no masses present  · Bladder: non-tender to palpation  · Urethra: appears normal  · Cervix: normal   · Uterus: normal size, shape and consistency  · Adnexa: no adnexal tenderness present, no adnexal masses present  · Perineum: perineum within normal limits, no evidence of trauma, no rashes or skin lesions present  · Anus: anus within normal limits, no hemorrhoids present  · Inguinal Lymph Nodes: no lymphadenopathy present    Skin  · General Inspection: no rash, no lesions identified    Neurologic/Psychiatric  · Mental Status:  · Orientation: grossly oriented to person, place and time  · Mood and Affect: mood normal, affect appropriate    No results found for this visit on 01/05/21. No orders of the defined types were placed in this encounter.         1. Routine postpartum follow-up  PREMARIN CREAM TO IMPROVE HEALING OF VAG TEAR

## 2021-01-06 LAB
EST. AVERAGE GLUCOSE BLD GHB EST-MCNC: 111 MG/DL
HBA1C MFR BLD: 5.5 % (ref 4.8–5.6)

## 2021-02-23 ENCOUNTER — OFFICE VISIT (OUTPATIENT)
Dept: OBGYN CLINIC | Age: 31
End: 2021-02-23
Payer: COMMERCIAL

## 2021-02-23 VITALS
WEIGHT: 202 LBS | HEIGHT: 62 IN | DIASTOLIC BLOOD PRESSURE: 84 MMHG | BODY MASS INDEX: 37.17 KG/M2 | TEMPERATURE: 96.8 F | SYSTOLIC BLOOD PRESSURE: 148 MMHG

## 2021-02-23 DIAGNOSIS — Z30.017 NEXPLANON INSERTION: ICD-10-CM

## 2021-02-23 DIAGNOSIS — N94.89 SUPPRESSION OF MENSES: Primary | ICD-10-CM

## 2021-02-23 PROCEDURE — 11981 INSERTION DRUG DLVR IMPLANT: CPT | Performed by: OBSTETRICS & GYNECOLOGY

## 2021-02-23 RX ORDER — ETONOGESTREL 68 MG/1
IMPLANT SUBCUTANEOUS
COMMUNITY
Start: 2021-02-02 | End: 2021-02-23 | Stop reason: SDUPTHER

## 2021-02-23 NOTE — PROGRESS NOTES
Subjective:  Chief Complaints:       1. Nexplanon Insertion. HPI:         Eloisa Cesar is a 31 y.o. female who desires Nexplanon 3yr implant for menstrual control; declines other methods that have been explained to her. Risks vs benefits explained. Pt verbalizes understanding and wishes to proceed. She understands she may have irregular bleeding/cramping or no periods. She has no complaints at this time. Pregnancy test is negative. Current Outpatient Medications   Medication Sig    conjugated estrogens (PREMARIN) 0.625 mg/gram vaginal cream Insert 0.5 g into vagina daily.  ibuprofen (MOTRIN) 800 mg tablet Take 1 Tab by mouth every eight (8) hours as needed for Pain. No current facility-administered medications for this visit. Objective:  Physical Examination:  GENERAL:     General: alert and oriented x 3, well developed and well nourished. HEART: regular rate and rhythm, normal S1S2, no murmurs. LUNGS:  clear to auscultation bilaterally, no wheezes, rhonchi/rales. SKIN: normal, no rash or skin lesions. Assessment:  The primary encounter diagnosis was Suppression of menses. A diagnosis of Nexplanon insertion was also pertinent to this visit. Plan:  Start Nexplanon implant, 68 mg, 1 ea, subcutaneously, once, 1 dose. Discussed that a small percentage of Nexplanon users will experience spotting/irreg bleeding with or without cramping for up to 90 days; can take NSAID if implant site has pain. Watch for infection of bleeding from implant site, notify us or go to ER if any problems. All questions answered. Procedures:  Nexplanon Implant Device: Insertion Nexplanon Implant. Consent informed consent obtained, Pregnancy test negative, 30 second time out taken. Understands no contraception is 100% effective. Patient placed in supine position, measured 8-10cm from elbow to implant site, marked left arm, 2 swabs of alcohol, injected 4-5cc 1% lido with epi, prepped Betadine  x 3. Subdermal insertion of device left arm, inserted without difficulty. Post procedure: the patient tolerated procedure well, instructed to take NSAIDS as directed for cramping, informed her that cramping and spotting are to be expected. Instructed to call us or ER if any signs of infection.     Results for orders placed or performed in visit on 02/23/21   INSERTION DRUG IMPLANT DEVICE    Impression    nexplanon insertion        Orders Placed This Encounter    INSERTION DRUG IMPLANT DEVICE    DISCONTD: Nexplanon 68 mg impl    etonogestreL impl 1 Device       Follow Up:

## 2021-05-05 ENCOUNTER — OFFICE VISIT (OUTPATIENT)
Dept: OBGYN CLINIC | Age: 31
End: 2021-05-05
Payer: COMMERCIAL

## 2021-05-05 VITALS
HEIGHT: 62 IN | SYSTOLIC BLOOD PRESSURE: 131 MMHG | BODY MASS INDEX: 47.75 KG/M2 | WEIGHT: 259.5 LBS | DIASTOLIC BLOOD PRESSURE: 77 MMHG

## 2021-05-05 DIAGNOSIS — Z11.3 SCREENING FOR VENEREAL DISEASE: ICD-10-CM

## 2021-05-05 DIAGNOSIS — Z01.419 PAP SMEAR, AS PART OF ROUTINE GYNECOLOGICAL EXAMINATION: Primary | ICD-10-CM

## 2021-05-05 DIAGNOSIS — Z11.51 SCREENING FOR HUMAN PAPILLOMAVIRUS: ICD-10-CM

## 2021-05-05 PROCEDURE — 99395 PREV VISIT EST AGE 18-39: CPT | Performed by: OBSTETRICS & GYNECOLOGY

## 2021-05-05 RX ORDER — PRENATAL VIT 96/IRON FUM/FOLIC 27MG-0.8MG
1 TABLET ORAL DAILY
Qty: 30 TAB | Refills: 5 | Status: SHIPPED | OUTPATIENT
Start: 2021-05-05

## 2021-05-05 NOTE — PROGRESS NOTES
Josemanuel Gupta is a 31 y.o. female who presents today for the following:  Chief Complaint   Patient presents with    Annual Exam     Pt presents for annual exam with no new complaints //MKeeley         No Known Allergies    Current Outpatient Medications   Medication Sig    conjugated estrogens (PREMARIN) 0.625 mg/gram vaginal cream Insert 0.5 g into vagina daily.  ibuprofen (MOTRIN) 800 mg tablet Take 1 Tab by mouth every eight (8) hours as needed for Pain. No current facility-administered medications for this visit.         Past Medical History:   Diagnosis Date    Abnormal Papanicolaou smear of cervix     Gestational diabetes     Herpes     Hypercholesterolemia        Past Surgical History:   Procedure Laterality Date    HX COLONOSCOPY N/A     HX TONSIL AND ADENOIDECTOMY         Family History   Problem Relation Age of Onset    Hypertension Mother     Colon Cancer Mother     Hypertension Father     Colon Cancer Father     Hypertension Paternal Grandmother     Diabetes Paternal Grandfather     Colon Cancer Paternal Grandfather        Social History     Socioeconomic History    Marital status: SINGLE     Spouse name: Not on file    Number of children: Not on file    Years of education: Not on file    Highest education level: Associate degree: academic program   Occupational History    Not on file   Social Needs    Financial resource strain: Not on file    Food insecurity     Worry: Not on file     Inability: Not on file   [employer] needs     Medical: Not on file     Non-medical: Not on file   Tobacco Use    Smoking status: Never Smoker    Smokeless tobacco: Never Used   Substance and Sexual Activity    Alcohol use: Not Currently    Drug use: Never    Sexual activity: Yes     Partners: Male     Birth control/protection: None   Lifestyle    Physical activity     Days per week: Not on file     Minutes per session: Not on file    Stress: Not on file   Relationships    Social connections     Talks on phone: Not on file     Gets together: Not on file     Attends Congregation service: Not on file     Active member of club or organization: Not on file     Attends meetings of clubs or organizations: Not on file     Relationship status: Not on file    Intimate partner violence     Fear of current or ex partner: Not on file     Emotionally abused: Not on file     Physically abused: Not on file     Forced sexual activity: Not on file   Other Topics Concern     Service Not Asked    Blood Transfusions No    Caffeine Concern Not Asked    Occupational Exposure Not Asked   [place] Hazards Not Asked    Sleep Concern Not Asked    Stress Concern Not Asked    Weight Concern Not Asked    Special Diet Not Asked    Back Care Not Asked    Exercise Not Asked    Bike Helmet Not Asked   [address] Not Asked    Self-Exams Not Asked   Social History Narrative    Not on file         ROS   Review of Systems   Constitutional: Negative. HENT: Negative. Eyes: Negative. Respiratory: Negative. Cardiovascular: Negative. Gastrointestinal: Negative. Genitourinary: Negative. Musculoskeletal: Negative. Skin: Negative. Neurological: Negative. Endo/Heme/Allergies: Negative. Psychiatric/Behavioral: Negative. /77   Ht 5' 2\" (1.575 m)   Wt 259 lb 8 oz (117.7 kg)   BMI 47.46 kg/m²    OBGyn Exam   Constitutional  · Appearance: well-nourished, well developed, alert, in no acute distress    HENT  · Head and Face: appears normal    Neck  · Inspection/Palpation: normal appearance, no masses or tenderness  · Lymph Nodes: no lymphadenopathy present  · Thyroid: gland size normal, nontender, no nodules or masses present on palpation    Breasts   Symmetric, no palpable masses, no tenderness, no skin changes, no nipple abnormality, no nipple discharge, no lymphadenopathy.     Chest  · Respiratory Effort: breathing labored  · Auscultation: normal breath sounds    Cardiovascular  · Heart:  · Auscultation: regular rate and rhythm without murmur    Gastrointestinal  · Abdominal Examination: abdomen non-tender to palpation, normal bowel sounds, no masses present  · Liver and spleen: no hepatomegaly present, spleen not palpable  · Hernias: no hernias identified    Genitourinary  · External Genitalia: normal appearance for age, no discharge present, no tenderness present, no inflammatory lesions present, no masses present, no atrophy present  · Vagina: normal vaginal vault without central or paravaginal defects, no discharge present, no inflammatory lesions present, no masses present  · Bladder: non-tender to palpation  · Urethra: appears normal  · Cervix: normal   · Uterus: normal size, shape and consistency  · Adnexa: no adnexal tenderness present, no adnexal masses present  · Perineum: perineum within normal limits, no evidence of trauma, no rashes or skin lesions present  · Anus: anus within normal limits, no hemorrhoids present  · Inguinal Lymph Nodes: no lymphadenopathy present    Skin  · General Inspection: no rash, no lesions identified    Neurologic/Psychiatric  · Mental Status:  · Orientation: grossly oriented to person, place and time  · Mood and Affect: mood normal, affect appropriate    No results found for this visit on 05/05/21. Orders Placed This Encounter    HIV 1/2 AG/AB, 4TH GENERATION,W RFLX CONFIRM    RPR    HEPATITIS PANEL, ACUTE    PAP IG, CT-NG-TV, RFX APTIMA HPV ASCUS ([id number])     Order Specific Question:   Pap Source? Answer:   Cervical     Order Specific Question:   Total Hysterectomy? Answer:   No     Order Specific Question:   Supracervical Hysterectomy? Answer:   No     Order Specific Question:   Post Menopausal?     Answer:   No     Order Specific Question:   Hormone Therapy? Answer:   No     Order Specific Question:   IUD? Answer:   No     Order Specific Question:   Abnormal Bleeding? Answer:    No Order Specific Question:   Pregnant     Answer:   No     Order Specific Question:   Post Partum? Answer:   No     29 YO ANNUAL  NEXPLANON    1. Pap smear, as part of routine gynecological examination    - PAP IG, CT-NG-TV, RFX APTIMA HPV ASCUS ([id number])    2. Screening for human papillomavirus    - PAP IG, CT-NG-TV, RFX APTIMA HPV ASCUS ([id number])    3.  Screening for venereal disease    - PAP IG, CT-NG-TV, RFX APTIMA HPV ASCUS ([id number])  - HIV 1/2 AG/AB, 4TH GENERATION,W RFLX CONFIRM  - RPR  - HEPATITIS PANEL, ACUTE

## 2021-05-06 LAB
HAV IGM SERPL QL IA: NEGATIVE
HBV CORE IGM SERPL QL IA: NEGATIVE
HBV SURFACE AG SERPL QL IA: NEGATIVE
HCV AB S/CO SERPL IA: <0.1 S/CO RATIO (ref 0–0.9)
HIV 1+2 AB+HIV1 P24 AG SERPL QL IA: NON REACTIVE
RPR SER QL: NON REACTIVE

## 2021-05-09 LAB
C TRACH RRNA CVX QL NAA+PROBE: NEGATIVE
CYTOLOGIST CVX/VAG CYTO: NORMAL
CYTOLOGY CVX/VAG DOC CYTO: NORMAL
CYTOLOGY CVX/VAG DOC THIN PREP: NORMAL
DX ICD CODE: NORMAL
LABCORP, 190119: NORMAL
Lab: NORMAL
Lab: NORMAL
N GONORRHOEA RRNA CVX QL NAA+PROBE: NEGATIVE
OTHER STN SPEC: NORMAL
STAT OF ADQ CVX/VAG CYTO-IMP: NORMAL
T VAGINALIS RRNA SPEC QL NAA+PROBE: NEGATIVE

## 2022-07-22 NOTE — LETTER
NOTIFICATION MAY NOT RETURN TO WORK / SCHOOL 
 
11/11/2020 9:40 AM 
 
[id number] To Whom It May Concern: 
 
Sujey Dwyer is currently under the care of Scott Schaffer. She may not return to work as of 11/11/2020 until 6-8 weeks after delivery. If there are questions or concerns please have the patient contact our office. Sincerely, Alexi Champion MD 
 
                                
 
 Prophylactic measure

## 2022-09-13 ENCOUNTER — OFFICE VISIT (OUTPATIENT)
Dept: OBGYN CLINIC | Age: 32
End: 2022-09-13
Payer: COMMERCIAL

## 2022-09-13 VITALS
WEIGHT: 237 LBS | HEIGHT: 62 IN | SYSTOLIC BLOOD PRESSURE: 136 MMHG | BODY MASS INDEX: 43.61 KG/M2 | DIASTOLIC BLOOD PRESSURE: 64 MMHG

## 2022-09-13 DIAGNOSIS — Z01.419 PAP SMEAR, AS PART OF ROUTINE GYNECOLOGICAL EXAMINATION: Primary | ICD-10-CM

## 2022-09-13 DIAGNOSIS — Z11.3 SCREENING FOR VENEREAL DISEASE: ICD-10-CM

## 2022-09-13 DIAGNOSIS — Z11.51 SCREENING FOR HUMAN PAPILLOMAVIRUS: ICD-10-CM

## 2022-09-13 PROCEDURE — 99395 PREV VISIT EST AGE 18-39: CPT | Performed by: OBSTETRICS & GYNECOLOGY

## 2022-09-13 NOTE — PROGRESS NOTES
Shiloh Goddard is a 27 y.o. female who presents today for the following:  Chief Complaint   Patient presents with    Annual Exam     Pt presents for annual exam with no complaints //MKeeley         No Known Allergies    Current Outpatient Medications   Medication Sig    prenatal PBDD50/RZMD fum/folic (prenatal vitamin) 27 mg iron- 800 mcg tab tablet Take 1 Tab by mouth daily. conjugated estrogens (PREMARIN) 0.625 mg/gram vaginal cream Insert 0.5 g into vagina daily. ibuprofen (MOTRIN) 800 mg tablet Take 1 Tab by mouth every eight (8) hours as needed for Pain. No current facility-administered medications for this visit.        Past Medical History:   Diagnosis Date    Abnormal Papanicolaou smear of cervix     Gestational diabetes     Herpes     Hypercholesterolemia        Past Surgical History:   Procedure Laterality Date    HX COLONOSCOPY N/A     HX TONSIL AND ADENOIDECTOMY         Family History   Problem Relation Age of Onset    Hypertension Mother     Colon Cancer Mother     Hypertension Father     Colon Cancer Father     Diabetes Father     Hypertension Paternal Grandmother     Diabetes Paternal Grandfather     Colon Cancer Paternal Grandfather        Social History     Socioeconomic History    Marital status: SINGLE     Spouse name: Not on file    Number of children: Not on file    Years of education: Not on file    Highest education level: Associate degree: academic program   Occupational History    Not on file   Tobacco Use    Smoking status: Never    Smokeless tobacco: Never   Substance and Sexual Activity    Alcohol use: Not Currently    Drug use: Never    Sexual activity: Yes     Partners: Male     Birth control/protection: Implant   Other Topics Concern     Service Not Asked    Blood Transfusions No    Caffeine Concern Not Asked    Occupational Exposure Not Asked    Hobby Hazards Not Asked    Sleep Concern Not Asked    Stress Concern Not Asked    Weight Concern Not Asked    Special Diet Not Asked    Back Care Not Asked    Exercise Not Asked    Bike Helmet Not Asked    Seat Belt Not Asked    Self-Exams Not Asked   Social History Narrative    Not on file     Social Determinants of Health     Financial Resource Strain: Not on file   Food Insecurity: Not on file   Transportation Needs: Not on file   Physical Activity: Not on file   Stress: Not on file   Social Connections: Not on file   Intimate Partner Violence: Not on file   Housing Stability: Not on file         ROS   Review of Systems   Constitutional: Negative. HENT: Negative. Eyes: Negative. Respiratory: Negative. Cardiovascular: Negative. Gastrointestinal: Negative. Genitourinary: Negative. Musculoskeletal: Negative. Skin: Negative. Neurological: Negative. Endo/Heme/Allergies: Negative. Psychiatric/Behavioral: Negative. /64   Ht 5' 2\" (1.575 m)   Wt 237 lb (107.5 kg)   BMI 43.35 kg/m²    OBGyn Exam   Constitutional  Appearance: well-nourished, well developed, alert, in no acute distress    HENT  Head and Face: appears normal    Neck  Inspection/Palpation: normal appearance, no masses or tenderness  Lymph Nodes: no lymphadenopathy present  Thyroid: gland size normal, nontender, no nodules or masses present on palpation    Breasts  Symmetric, no palpable masses, no tenderness, no skin changes, no nipple abnormality, no nipple discharge, no lymphadenopathy. Chest  Respiratory Effort: breathing labored  Auscultation: normal breath sounds    Cardiovascular  Heart:   Auscultation: regular rate and rhythm without murmur    Gastrointestinal  Abdominal Examination: abdomen non-tender to palpation, normal bowel sounds, no masses present  Liver and spleen: no hepatomegaly present, spleen not palpable  Hernias: no hernias identified    Genitourinary  External Genitalia: normal appearance for age, no discharge present, no tenderness present, no inflammatory lesions present, no masses present, no atrophy present  Vagina: normal vaginal vault without central or paravaginal defects, no discharge present, no inflammatory lesions present, no masses present  Bladder: non-tender to palpation  Urethra: appears normal  Cervix: normal   Uterus: normal size, shape and consistency  Adnexa: no adnexal tenderness present, no adnexal masses present  Perineum: perineum within normal limits, no evidence of trauma, no rashes or skin lesions present  Anus: anus within normal limits, no hemorrhoids present  Inguinal Lymph Nodes: no lymphadenopathy present    Skin  General Inspection: no rash, no lesions identified    Neurologic/Psychiatric  Mental Status:  Orientation: grossly oriented to person, place and time  Mood and Affect: mood normal, affect appropriate    No results found for this visit on 09/13/22. Orders Placed This Encounter    PAP IG, CT-NG-TV, RFX APTIMA HPV ASCUS ([id number])     Order Specific Question:   Pap Source? Answer:   Cervical     Order Specific Question:   Total Hysterectomy? Answer:   No     Order Specific Question:   Supracervical Hysterectomy? Answer:   No     Order Specific Question:   Post Menopausal?     Answer:   No     Order Specific Question:   Hormone Therapy? Answer:   No     Order Specific Question:   IUD? Answer:   No     Order Specific Question:   Abnormal Bleeding? Answer:   No     Order Specific Question:   Pregnant     Answer:   No     Order Specific Question:   Post Partum? Answer:   No     32 YO ANNUAL  NEXPLANON    1. Pap smear, as part of routine gynecological examination  - PAP IG, CT-NG-TV, RFX APTIMA HPV ASCUS ([id number])    2. Screening for human papillomavirus    - PAP IG, CT-NG-TV, RFX APTIMA HPV ASCUS ([id number])    3. Screening for venereal disease    - PAP IG, CT-NG-TV, RFX APTIMA HPV ASCUS ([id number])        Follow-up and Dispositions    Return in about 1 year (around 9/13/2023).

## 2023-05-22 RX ORDER — IBUPROFEN 800 MG/1
800 TABLET ORAL EVERY 8 HOURS PRN
COMMUNITY
Start: 2020-11-21

## 2024-05-15 ENCOUNTER — TELEPHONE (OUTPATIENT)
Age: 34
End: 2024-05-15

## 2024-10-15 ENCOUNTER — OFFICE VISIT (OUTPATIENT)
Age: 34
End: 2024-10-15
Payer: COMMERCIAL

## 2024-10-15 VITALS
DIASTOLIC BLOOD PRESSURE: 88 MMHG | SYSTOLIC BLOOD PRESSURE: 150 MMHG | BODY MASS INDEX: 42.35 KG/M2 | HEIGHT: 62 IN | WEIGHT: 230.13 LBS

## 2024-10-15 DIAGNOSIS — Z11.51 SCREENING FOR HUMAN PAPILLOMAVIRUS: ICD-10-CM

## 2024-10-15 DIAGNOSIS — Z00.00 ANNUAL VISIT FOR GENERAL ADULT MEDICAL EXAMINATION WITHOUT ABNORMAL FINDINGS: Primary | ICD-10-CM

## 2024-10-15 DIAGNOSIS — Z01.419 PAP SMEAR, AS PART OF ROUTINE GYNECOLOGICAL EXAMINATION: ICD-10-CM

## 2024-10-15 DIAGNOSIS — Z11.3 SCREENING FOR VENEREAL DISEASE: ICD-10-CM

## 2024-10-15 PROCEDURE — 99395 PREV VISIT EST AGE 18-39: CPT | Performed by: OBSTETRICS & GYNECOLOGY

## 2024-10-15 RX ORDER — TOPIRAMATE 100 MG/1
1 TABLET, FILM COATED ORAL 2 TIMES DAILY
COMMUNITY

## 2024-10-15 RX ORDER — OFLOXACIN 3 MG/ML
SOLUTION/ DROPS OPHTHALMIC
COMMUNITY
Start: 2024-10-07

## 2024-10-15 NOTE — PROGRESS NOTES
Sherrie Islas is a 34 y.o. female who presents today for the following:  Chief Complaint   Patient presents with    Annual Exam     Pt presents for annual exam with no complaints//MKeeley         No Known Allergies    Current Outpatient Medications   Medication Sig Dispense Refill    ofloxacin (OCUFLOX) 0.3 % solution       topiramate (TOPAMAX) 100 MG tablet Take 1 tablet by mouth in the morning and at bedtime      ibuprofen (ADVIL;MOTRIN) 800 MG tablet Take 1 tablet by mouth every 8 hours as needed       No current facility-administered medications for this visit.       Past Medical History:   Diagnosis Date    Abnormal Papanicolaou smear of cervix     Gestational diabetes     Gestational diabetes mellitus     Herpes     Hypercholesterolemia        Past Surgical History:   Procedure Laterality Date    COLONOSCOPY N/A     TONSILLECTOMY AND ADENOIDECTOMY         Family History   Problem Relation Age of Onset    Diabetes Paternal Grandfather     Hypertension Paternal Grandmother     Hypertension Mother     Colon Cancer Mother     Hypertension Father     Colon Cancer Father     Colon Cancer Paternal Grandfather     Diabetes Father        Social History     Socioeconomic History    Marital status: Single     Spouse name: Not on file    Number of children: Not on file    Years of education: Not on file    Highest education level: Not on file   Occupational History    Not on file   Tobacco Use    Smoking status: Never    Smokeless tobacco: Never   Substance and Sexual Activity    Alcohol use: Not Currently    Drug use: Never    Sexual activity: Not Currently     Birth control/protection: Implant   Other Topics Concern    Not on file   Social History Narrative    Not on file     Social Determinants of Health     Financial Resource Strain: Not on file   Food Insecurity: Not on file   Transportation Needs: Not on file   Physical Activity: Not on file   Stress: Not on file   Social Connections: Not on file   Intimate Partner

## 2024-10-16 LAB
HAV IGM SERPL QL IA: NEGATIVE
HBV CORE IGM SERPL QL IA: NEGATIVE
HBV SURFACE AG SERPL QL IA: NEGATIVE
HCV AB SERPL QL IA: NORMAL
HCV IGG SERPL QL IA: NON REACTIVE
HIV 1+2 AB+HIV1 P24 AG SERPL QL IA: NON REACTIVE
RPR SER QL: NON REACTIVE

## 2024-10-19 LAB
., LABCORP: ABNORMAL
C TRACH RRNA CVX QL NAA+PROBE: NEGATIVE
CYTOLOGIST CVX/VAG CYTO: ABNORMAL
CYTOLOGY CVX/VAG DOC CYTO: ABNORMAL
CYTOLOGY CVX/VAG DOC THIN PREP: ABNORMAL
DX ICD CODE: ABNORMAL
Lab: ABNORMAL
Lab: ABNORMAL
N GONORRHOEA RRNA CVX QL NAA+PROBE: NEGATIVE
OTHER STN SPEC: ABNORMAL
STAT OF ADQ CVX/VAG CYTO-IMP: ABNORMAL
T VAGINALIS RRNA SPEC QL NAA+PROBE: POSITIVE

## 2024-10-23 DIAGNOSIS — A59.9 TRICHOMONIASIS: Primary | ICD-10-CM

## 2024-10-23 RX ORDER — METRONIDAZOLE 500 MG/1
TABLET ORAL
Qty: 4 TABLET | Refills: 0 | Status: SHIPPED | OUTPATIENT
Start: 2024-10-23

## 2024-11-26 ENCOUNTER — OFFICE VISIT (OUTPATIENT)
Age: 34
End: 2024-11-26
Payer: COMMERCIAL

## 2024-11-26 VITALS
HEIGHT: 62 IN | WEIGHT: 232.5 LBS | DIASTOLIC BLOOD PRESSURE: 82 MMHG | BODY MASS INDEX: 42.78 KG/M2 | SYSTOLIC BLOOD PRESSURE: 146 MMHG

## 2024-11-26 DIAGNOSIS — A59.9 TRICHOMONIASIS: Primary | ICD-10-CM

## 2024-11-26 PROCEDURE — 99213 OFFICE O/P EST LOW 20 MIN: CPT | Performed by: OBSTETRICS & GYNECOLOGY

## 2024-11-26 NOTE — PROGRESS NOTES
Activity: Not on file   Stress: Not on file   Social Connections: Not on file   Intimate Partner Violence: Not on file   Housing Stability: Not on file         Review of Systems     Review of Systems   Constitutional: Negative.    HENT: Negative.    Eyes: Negative.    Respiratory: Negative.    Cardiovascular: Negative.    Gastrointestinal: Negative.    Genitourinary: Negative.    Musculoskeletal: Negative.    Skin: Negative.    Neurological: Negative.    Endo/Heme/Allergies: Negative.    Psychiatric/Behavioral: Negative.      BP (!) 146/82   Ht 1.575 m (5' 2\")   Wt 105.5 kg (232 lb 8 oz)   BMI 42.52 kg/m²    OBGyn Exam   Constitutional  Appearance: well-nourished, well developed, alert, in no acute distress    HENT  Head and Face: appears normal    Chest  Respiratory Effort: breathing labored    Gastrointestinal  Abdominal Examination: abdomen non-tender to palpation, normal bowel sounds, no masses present    Genitourinary  External Genitalia: normal appearance for age, no discharge present, no tenderness present, no inflammatory lesions present, no masses present, no atrophy present  Vagina: normal vaginal vault without central or paravaginal defects, no discharge present, no inflammatory lesions present, no masses present  Bladder: non-tender to palpation  Urethra: appears normal  Cervix: normal   Uterus: normal size, shape and consistency  Adnexa: no adnexal tenderness present, no adnexal masses present  Perineum: perineum within normal limits, no evidence of trauma, no rashes or skin lesions present  Anus: anus within normal limits, no hemorrhoids present  Inguinal Lymph Nodes: no lymphadenopathy present    Skin  General Inspection: no rash, no lesions identified    Neurologic/Psychiatric  Mental Status:  Orientation: grossly oriented to person, place and time  Mood and Affect: mood normal, affect appropriate        Orders Placed This Encounter   Procedures    NuSwab Vaginitis Plus (VG+) with Candida (Six

## 2024-11-30 LAB
A VAGINAE DNA VAG QL NAA+PROBE: ABNORMAL SCORE
BVAB2 DNA VAG QL NAA+PROBE: ABNORMAL SCORE
C ALBICANS DNA VAG QL NAA+PROBE: NEGATIVE
C GLABRATA DNA VAG QL NAA+PROBE: NEGATIVE
C KRUSEI DNA VAG QL NAA+PROBE: NEGATIVE
C LUSITANIAE DNA VAG QL NAA+PROBE: NEGATIVE
C TRACH DNA SPEC QL NAA+PROBE: NEGATIVE
CANDIDA DNA VAG QL NAA+PROBE: NEGATIVE
MEGA1 DNA VAG QL NAA+PROBE: ABNORMAL SCORE
N GONORRHOEA DNA VAG QL NAA+PROBE: NEGATIVE
T VAGINALIS DNA VAG QL NAA+PROBE: POSITIVE

## 2024-12-11 DIAGNOSIS — A59.9 TRICHOMONIASIS: ICD-10-CM

## 2024-12-11 RX ORDER — METRONIDAZOLE 500 MG/1
TABLET ORAL
Qty: 4 TABLET | Refills: 0 | Status: SHIPPED | OUTPATIENT
Start: 2024-12-11

## 2025-01-14 ENCOUNTER — OFFICE VISIT (OUTPATIENT)
Age: 35
End: 2025-01-14
Payer: COMMERCIAL

## 2025-01-14 VITALS
BODY MASS INDEX: 42.72 KG/M2 | HEIGHT: 62 IN | DIASTOLIC BLOOD PRESSURE: 79 MMHG | SYSTOLIC BLOOD PRESSURE: 113 MMHG | WEIGHT: 232.13 LBS

## 2025-01-14 DIAGNOSIS — A59.9 TRICHOMONAS INFECTION: Primary | ICD-10-CM

## 2025-01-14 PROCEDURE — 99213 OFFICE O/P EST LOW 20 MIN: CPT | Performed by: OBSTETRICS & GYNECOLOGY

## 2025-01-14 NOTE — PROGRESS NOTES
Transportation Needs: Not on file   Physical Activity: Not on file   Stress: Not on file   Social Connections: Not on file   Intimate Partner Violence: Not on file   Housing Stability: Not on file         Review of Systems     Review of Systems   Constitutional: Negative.    HENT: Negative.    Eyes: Negative.    Respiratory: Negative.    Cardiovascular: Negative.    Gastrointestinal: Negative.    Genitourinary: Negative.    Musculoskeletal: Negative.    Skin: Negative.    Neurological: Negative.    Endo/Heme/Allergies: Negative.    Psychiatric/Behavioral: Negative.      /79   Ht 1.575 m (5' 2\")   Wt 105.3 kg (232 lb 2 oz)   BMI 42.46 kg/m²    OBGyn Exam   Constitutional  Appearance: well-nourished, well developed, alert, in no acute distress    HENT  Head and Face: appears normal    Chest  Respiratory Effort: breathing labored    Gastrointestinal  Abdominal Examination: abdomen non-tender to palpation, normal bowel sounds, no masses present    Genitourinary  External Genitalia: normal appearance for age, no discharge present, no tenderness present, no inflammatory lesions present, no masses present, no atrophy present  Vagina: normal vaginal vault without central or paravaginal defects, no discharge present, no inflammatory lesions present, no masses present  Bladder: non-tender to palpation  Urethra: appears normal  Cervix: normal   Uterus: normal size, shape and consistency  Adnexa: no adnexal tenderness present, no adnexal masses present  Perineum: perineum within normal limits, no evidence of trauma, no rashes or skin lesions present  Anus: anus within normal limits, no hemorrhoids present  Inguinal Lymph Nodes: no lymphadenopathy present    Skin  General Inspection: no rash, no lesions identified    Neurologic/Psychiatric  Mental Status:  Orientation: grossly oriented to person, place and time  Mood and Affect: mood normal, affect appropriate        Orders Placed This Encounter   Procedures    NuSwab

## 2025-01-21 RX ORDER — TINIDAZOLE 500 MG/1
TABLET ORAL
Qty: 4 TABLET | Refills: 0 | Status: SHIPPED | OUTPATIENT
Start: 2025-01-21

## 2025-02-25 ENCOUNTER — OFFICE VISIT (OUTPATIENT)
Age: 35
End: 2025-02-25
Payer: COMMERCIAL

## 2025-02-25 VITALS
DIASTOLIC BLOOD PRESSURE: 77 MMHG | HEIGHT: 62 IN | WEIGHT: 234 LBS | BODY MASS INDEX: 43.06 KG/M2 | SYSTOLIC BLOOD PRESSURE: 134 MMHG

## 2025-02-25 DIAGNOSIS — A59.9 TRICHOMONIASIS: Primary | ICD-10-CM

## 2025-02-25 PROCEDURE — 99213 OFFICE O/P EST LOW 20 MIN: CPT | Performed by: OBSTETRICS & GYNECOLOGY

## 2025-02-25 NOTE — PROGRESS NOTES
Sherrie Islas is a 34 y.o. female who presents today for the following:  Chief Complaint   Patient presents with    Sexually Transmitted Diseases     Pt presents for NICKI, POS Trich 1/14/2025 //Anyi         No Known Allergies    Current Outpatient Medications   Medication Sig Dispense Refill    tinidazole (TINDAMAX) 500 MG tablet Take 4 tablets as a one time dose 4 tablet 0    metroNIDAZOLE (FLAGYL) 500 MG tablet Take 4 tablets as a one time dose 4 tablet 0    topiramate (TOPAMAX) 100 MG tablet Take 1 tablet by mouth in the morning and at bedtime      ofloxacin (OCUFLOX) 0.3 % solution       ibuprofen (ADVIL;MOTRIN) 800 MG tablet Take 1 tablet by mouth every 8 hours as needed       No current facility-administered medications for this visit.       Past Medical History:   Diagnosis Date    Abnormal Papanicolaou smear of cervix     Gestational diabetes     Gestational diabetes mellitus     Herpes     Hypercholesterolemia     Trichomonas infection 11/26/2024       Past Surgical History:   Procedure Laterality Date    COLONOSCOPY N/A     TONSILLECTOMY AND ADENOIDECTOMY         Family History   Problem Relation Age of Onset    Diabetes Paternal Grandfather     Hypertension Paternal Grandmother     Hypertension Mother     Colon Cancer Mother     Hypertension Father     Colon Cancer Father     Colon Cancer Paternal Grandfather     Diabetes Father        Social History     Socioeconomic History    Marital status: Single     Spouse name: Not on file    Number of children: Not on file    Years of education: Not on file    Highest education level: Not on file   Occupational History    Not on file   Tobacco Use    Smoking status: Never    Smokeless tobacco: Never   Substance and Sexual Activity    Alcohol use: Not Currently    Drug use: Never    Sexual activity: Not Currently     Birth control/protection: Implant   Other Topics Concern    Not on file   Social History Narrative    Not on file     Social Determinants of Health

## 2025-02-28 LAB
A VAGINAE DNA VAG QL NAA+PROBE: NORMAL SCORE
BVAB2 DNA VAG QL NAA+PROBE: NORMAL SCORE
C ALBICANS DNA VAG QL NAA+PROBE: NEGATIVE
C GLABRATA DNA VAG QL NAA+PROBE: NEGATIVE
C KRUSEI DNA VAG QL NAA+PROBE: NEGATIVE
C LUSITANIAE DNA VAG QL NAA+PROBE: NEGATIVE
C TRACH DNA SPEC QL NAA+PROBE: NEGATIVE
CANDIDA DNA VAG QL NAA+PROBE: NEGATIVE
MEGA1 DNA VAG QL NAA+PROBE: NORMAL SCORE
N GONORRHOEA DNA VAG QL NAA+PROBE: NEGATIVE
T VAGINALIS DNA VAG QL NAA+PROBE: NEGATIVE